# Patient Record
Sex: FEMALE | Race: WHITE | Employment: UNEMPLOYED | URBAN - METROPOLITAN AREA
[De-identification: names, ages, dates, MRNs, and addresses within clinical notes are randomized per-mention and may not be internally consistent; named-entity substitution may affect disease eponyms.]

---

## 2024-02-16 RX ORDER — CLOTRIMAZOLE 1 %
CREAM (GRAM) TOPICAL
Status: ON HOLD | COMMUNITY
Start: 2023-12-17 | End: 2024-02-20

## 2024-02-16 RX ORDER — PANTOPRAZOLE SODIUM 40 MG/1
TABLET, DELAYED RELEASE ORAL
Status: ON HOLD | COMMUNITY
Start: 2024-01-06 | End: 2024-02-20

## 2024-02-16 RX ORDER — HYDROCORTISONE 10 MG/1
10 TABLET ORAL DAILY
COMMUNITY

## 2024-02-16 RX ORDER — CEFUROXIME AXETIL 250 MG/1
TABLET ORAL
Status: ON HOLD | COMMUNITY
Start: 2023-12-17 | End: 2024-02-20

## 2024-02-19 ENCOUNTER — OFFICE VISIT (OUTPATIENT)
Age: 29
End: 2024-02-19
Payer: MEDICAID

## 2024-02-19 ENCOUNTER — HOSPITAL ENCOUNTER (INPATIENT)
Facility: HOSPITAL | Age: 29
LOS: 4 days | Discharge: HOME | DRG: 204 | End: 2024-02-23
Attending: EMERGENCY MEDICINE | Admitting: INTERNAL MEDICINE
Payer: MEDICAID

## 2024-02-19 ENCOUNTER — APPOINTMENT (OUTPATIENT)
Facility: HOSPITAL | Age: 29
DRG: 204 | End: 2024-02-19
Payer: MEDICAID

## 2024-02-19 VITALS
BODY MASS INDEX: 18.22 KG/M2 | HEIGHT: 61 IN | SYSTOLIC BLOOD PRESSURE: 92 MMHG | HEART RATE: 81 BPM | DIASTOLIC BLOOD PRESSURE: 60 MMHG | RESPIRATION RATE: 17 BRPM | OXYGEN SATURATION: 96 % | WEIGHT: 96.5 LBS

## 2024-02-19 DIAGNOSIS — R53.1 WEAKNESS: ICD-10-CM

## 2024-02-19 DIAGNOSIS — E55.9 VITAMIN D DEFICIENCY: Primary | ICD-10-CM

## 2024-02-19 DIAGNOSIS — R79.89 LOW SERUM CORTISOL LEVEL: ICD-10-CM

## 2024-02-19 DIAGNOSIS — R55 SYNCOPE AND COLLAPSE: Primary | ICD-10-CM

## 2024-02-19 DIAGNOSIS — E53.8 VITAMIN B12 DEFICIENCY: ICD-10-CM

## 2024-02-19 PROBLEM — E27.40 ADRENAL INSUFFICIENCY (HCC): Status: ACTIVE | Noted: 2024-02-19

## 2024-02-19 LAB
ALBUMIN SERPL-MCNC: 4.2 G/DL (ref 3.5–5.2)
ALBUMIN/GLOB SERPL: 1.4 (ref 1.1–2.2)
ALP SERPL-CCNC: 99 U/L (ref 35–104)
ALT SERPL-CCNC: 18 U/L (ref 10–35)
ANION GAP SERPL CALC-SCNC: 11 MMOL/L (ref 5–15)
AST SERPL-CCNC: 16 U/L (ref 10–35)
BASOPHILS # BLD: 0 K/UL (ref 0–1)
BASOPHILS NFR BLD: 1 % (ref 0–1)
BILIRUB SERPL-MCNC: 0.2 MG/DL (ref 0.2–1)
BUN SERPL-MCNC: 14 MG/DL (ref 6–20)
BUN/CREAT SERPL: 27 (ref 12–20)
CALCIUM SERPL-MCNC: 8.8 MG/DL (ref 8.6–10)
CHLORIDE SERPL-SCNC: 102 MMOL/L (ref 98–107)
CO2 SERPL-SCNC: 24 MMOL/L (ref 22–29)
COMMENT:: NORMAL
CREAT SERPL-MCNC: 0.51 MG/DL (ref 0.5–0.9)
DIFFERENTIAL METHOD BLD: ABNORMAL
EOSINOPHIL # BLD: 0.2 K/UL (ref 0–0.4)
EOSINOPHIL NFR BLD: 3 %
ERYTHROCYTE [DISTWIDTH] IN BLOOD BY AUTOMATED COUNT: 13.9 % (ref 11.5–14.5)
GLOBULIN SER CALC-MCNC: 3.1 G/DL (ref 2–4)
GLUCOSE SERPL-MCNC: 78 MG/DL (ref 65–100)
HCT VFR BLD AUTO: 36.6 % (ref 35–47)
HGB BLD-MCNC: 12.4 G/DL (ref 11.5–16)
IMM GRANULOCYTES # BLD AUTO: 0 K/UL (ref 0–0.04)
IMM GRANULOCYTES NFR BLD AUTO: 0 % (ref 0–0.5)
LYMPHOCYTES # BLD: 3 K/UL (ref 0.8–3.5)
LYMPHOCYTES NFR BLD: 48 % (ref 12–49)
MAGNESIUM SERPL-MCNC: 2 MG/DL (ref 1.6–2.6)
MCH RBC QN AUTO: 29.7 PG (ref 26–34)
MCHC RBC AUTO-ENTMCNC: 33.9 G/DL (ref 30–36.5)
MCV RBC AUTO: 87.8 FL (ref 80–99)
MONOCYTES # BLD: 0.6 K/UL (ref 0–1)
MONOCYTES NFR BLD: 9 % (ref 5–13)
NEUTS SEG # BLD: 2.4 K/UL (ref 1.8–8)
NEUTS SEG NFR BLD: 39 % (ref 32–75)
NRBC # BLD: 0 K/UL (ref 0–0.01)
NRBC BLD-RTO: 0 PER 100 WBC
PLATELET # BLD AUTO: 290 K/UL (ref 150–400)
PMV BLD AUTO: 9.8 FL (ref 8.9–12.9)
POTASSIUM SERPL-SCNC: 3.9 MMOL/L (ref 3.5–5.1)
PROT SERPL-MCNC: 7.3 G/DL (ref 6.4–8.3)
RBC # BLD AUTO: 4.17 M/UL (ref 3.8–5.2)
SODIUM SERPL-SCNC: 137 MMOL/L (ref 136–145)
SPECIMEN HOLD: NORMAL
T4 FREE SERPL-MCNC: 1.2 NG/DL (ref 0.8–1.5)
TROPONIN I BLD-MCNC: <0.04 NG/ML (ref 0–0.08)
TSH SERPL DL<=0.05 MIU/L-ACNC: 2.55 UIU/ML (ref 0.27–4.2)
WBC # BLD AUTO: 6.2 K/UL (ref 3.6–11)

## 2024-02-19 PROCEDURE — 2580000003 HC RX 258: Performed by: INTERNAL MEDICINE

## 2024-02-19 PROCEDURE — A4216 STERILE WATER/SALINE, 10 ML: HCPCS | Performed by: INTERNAL MEDICINE

## 2024-02-19 PROCEDURE — 84484 ASSAY OF TROPONIN QUANT: CPT

## 2024-02-19 PROCEDURE — 6360000002 HC RX W HCPCS: Performed by: EMERGENCY MEDICINE

## 2024-02-19 PROCEDURE — 99205 OFFICE O/P NEW HI 60 MIN: CPT | Performed by: INTERNAL MEDICINE

## 2024-02-19 PROCEDURE — 93005 ELECTROCARDIOGRAM TRACING: CPT | Performed by: INTERNAL MEDICINE

## 2024-02-19 PROCEDURE — G0378 HOSPITAL OBSERVATION PER HR: HCPCS

## 2024-02-19 PROCEDURE — 99285 EMERGENCY DEPT VISIT HI MDM: CPT

## 2024-02-19 PROCEDURE — 2580000003 HC RX 258: Performed by: EMERGENCY MEDICINE

## 2024-02-19 PROCEDURE — 84443 ASSAY THYROID STIM HORMONE: CPT

## 2024-02-19 PROCEDURE — 2500000003 HC RX 250 WO HCPCS: Performed by: INTERNAL MEDICINE

## 2024-02-19 PROCEDURE — 84439 ASSAY OF FREE THYROXINE: CPT

## 2024-02-19 PROCEDURE — 96361 HYDRATE IV INFUSION ADD-ON: CPT

## 2024-02-19 PROCEDURE — 83735 ASSAY OF MAGNESIUM: CPT

## 2024-02-19 PROCEDURE — 85025 COMPLETE CBC W/AUTO DIFF WBC: CPT

## 2024-02-19 PROCEDURE — 96375 TX/PRO/DX INJ NEW DRUG ADDON: CPT

## 2024-02-19 PROCEDURE — 82533 TOTAL CORTISOL: CPT

## 2024-02-19 PROCEDURE — 71045 X-RAY EXAM CHEST 1 VIEW: CPT

## 2024-02-19 PROCEDURE — 70450 CT HEAD/BRAIN W/O DYE: CPT

## 2024-02-19 PROCEDURE — 80053 COMPREHEN METABOLIC PANEL: CPT

## 2024-02-19 PROCEDURE — 1100000000 HC RM PRIVATE

## 2024-02-19 PROCEDURE — 96374 THER/PROPH/DIAG INJ IV PUSH: CPT

## 2024-02-19 PROCEDURE — 36415 COLL VENOUS BLD VENIPUNCTURE: CPT

## 2024-02-19 RX ORDER — ACETAMINOPHEN 650 MG/1
650 SUPPOSITORY RECTAL EVERY 6 HOURS PRN
Status: DISCONTINUED | OUTPATIENT
Start: 2024-02-19 | End: 2024-02-23 | Stop reason: HOSPADM

## 2024-02-19 RX ORDER — SODIUM CHLORIDE, SODIUM LACTATE, POTASSIUM CHLORIDE, AND CALCIUM CHLORIDE .6; .31; .03; .02 G/100ML; G/100ML; G/100ML; G/100ML
1000 INJECTION, SOLUTION INTRAVENOUS ONCE
Status: COMPLETED | OUTPATIENT
Start: 2024-02-19 | End: 2024-02-19

## 2024-02-19 RX ORDER — POLYETHYLENE GLYCOL 3350 17 G/17G
17 POWDER, FOR SOLUTION ORAL DAILY PRN
Status: DISCONTINUED | OUTPATIENT
Start: 2024-02-19 | End: 2024-02-23 | Stop reason: HOSPADM

## 2024-02-19 RX ORDER — SODIUM CHLORIDE 0.9 % (FLUSH) 0.9 %
5-40 SYRINGE (ML) INJECTION EVERY 12 HOURS SCHEDULED
Status: DISCONTINUED | OUTPATIENT
Start: 2024-02-19 | End: 2024-02-23 | Stop reason: HOSPADM

## 2024-02-19 RX ORDER — SODIUM CHLORIDE 9 MG/ML
INJECTION, SOLUTION INTRAVENOUS PRN
Status: DISCONTINUED | OUTPATIENT
Start: 2024-02-19 | End: 2024-02-23 | Stop reason: HOSPADM

## 2024-02-19 RX ORDER — ONDANSETRON 2 MG/ML
4 INJECTION INTRAMUSCULAR; INTRAVENOUS EVERY 6 HOURS PRN
Status: DISCONTINUED | OUTPATIENT
Start: 2024-02-19 | End: 2024-02-23 | Stop reason: HOSPADM

## 2024-02-19 RX ORDER — SODIUM CHLORIDE 9 MG/ML
INJECTION, SOLUTION INTRAVENOUS CONTINUOUS
Status: DISCONTINUED | OUTPATIENT
Start: 2024-02-19 | End: 2024-02-23 | Stop reason: HOSPADM

## 2024-02-19 RX ORDER — MIDAZOLAM HYDROCHLORIDE 5 MG/5ML
5 INJECTION, SOLUTION INTRAMUSCULAR; INTRAVENOUS ONCE
Status: DISCONTINUED | OUTPATIENT
Start: 2024-02-19 | End: 2024-02-19

## 2024-02-19 RX ORDER — SODIUM CHLORIDE 0.9 % (FLUSH) 0.9 %
5-40 SYRINGE (ML) INJECTION PRN
Status: DISCONTINUED | OUTPATIENT
Start: 2024-02-19 | End: 2024-02-23 | Stop reason: HOSPADM

## 2024-02-19 RX ORDER — LORAZEPAM 2 MG/ML
1 INJECTION INTRAMUSCULAR ONCE
Status: COMPLETED | OUTPATIENT
Start: 2024-02-19 | End: 2024-02-20

## 2024-02-19 RX ORDER — ACETAMINOPHEN 325 MG/1
650 TABLET ORAL EVERY 6 HOURS PRN
Status: DISCONTINUED | OUTPATIENT
Start: 2024-02-19 | End: 2024-02-23 | Stop reason: HOSPADM

## 2024-02-19 RX ADMIN — SODIUM CHLORIDE, PRESERVATIVE FREE 10 ML: 5 INJECTION INTRAVENOUS at 20:42

## 2024-02-19 RX ADMIN — SODIUM CHLORIDE: 9 INJECTION, SOLUTION INTRAVENOUS at 20:40

## 2024-02-19 RX ADMIN — HYDROCORTISONE SODIUM SUCCINATE 100 MG: 100 INJECTION, POWDER, FOR SOLUTION INTRAMUSCULAR; INTRAVENOUS at 17:20

## 2024-02-19 RX ADMIN — LORAZEPAM 1 MG: 2 INJECTION INTRAMUSCULAR; INTRAVENOUS at 18:34

## 2024-02-19 RX ADMIN — FAMOTIDINE 20 MG: 10 INJECTION, SOLUTION INTRAVENOUS at 20:41

## 2024-02-19 RX ADMIN — SODIUM CHLORIDE, POTASSIUM CHLORIDE, SODIUM LACTATE AND CALCIUM CHLORIDE 1000 ML: 600; 310; 30; 20 INJECTION, SOLUTION INTRAVENOUS at 17:20

## 2024-02-19 ASSESSMENT — PAIN SCALES - GENERAL: PAINLEVEL_OUTOF10: 0

## 2024-02-19 ASSESSMENT — PAIN - FUNCTIONAL ASSESSMENT: PAIN_FUNCTIONAL_ASSESSMENT: NONE - DENIES PAIN

## 2024-02-19 ASSESSMENT — LIFESTYLE VARIABLES
HOW OFTEN DO YOU HAVE A DRINK CONTAINING ALCOHOL: NEVER
HOW MANY STANDARD DRINKS CONTAINING ALCOHOL DO YOU HAVE ON A TYPICAL DAY: PATIENT DOES NOT DRINK

## 2024-02-19 NOTE — PATIENT INSTRUCTIONS
Important     I have ordered medication/test and if you do not hear from the hospital in 7 business days  please call the number below for infusion center    [facility] [phone]

## 2024-02-19 NOTE — ED PROVIDER NOTES
EMERGENCY DEPARTMENT PHYSICIAN NOTE     Patient: Yasemin Manzano     Time of Service: 2024  3:58 PM     Chief complaint:   Chief Complaint   Patient presents with    Loss of Consciousness        HISTORY:  Patient is a 29 y.o. female who presents to the emergency department with complaints of syncope.       Past Medical History:   Diagnosis Date    Adrenal insufficiency (HCC)     Anxiety     Depression         Past Surgical History:   Procedure Laterality Date    APPENDECTOMY       SECTION  2023    CHOLECYSTECTOMY          No family history on file.     Social History     Socioeconomic History    Marital status: Single   Tobacco Use    Smoking status: Never    Smokeless tobacco: Never   Substance and Sexual Activity    Alcohol use: Never    Drug use: Never    Sexual activity: Not Currently     Social Determinants of Health     Food Insecurity: No Food Insecurity (2024)    Hunger Vital Sign     Worried About Running Out of Food in the Last Year: Never true     Ran Out of Food in the Last Year: Never true   Transportation Needs: No Transportation Needs (2024)    PRAPARE - Transportation     Lack of Transportation (Medical): No     Lack of Transportation (Non-Medical): No   Housing Stability: Low Risk  (2024)    Housing Stability Vital Sign     Unable to Pay for Housing in the Last Year: No     Number of Places Lived in the Last Year: 2     Unstable Housing in the Last Year: No        Current Medications: Reviewed in chart.    Allergies:   Allergies   Allergen Reactions    Beef Allergy Hives          REVIEW OF SYSTEMS: See HPI for pertinent positives and negatives.      PHYSICAL EXAM:  /62   Pulse 78   Temp 98.4 °F (36.9 °C)   Resp 19   Ht 1.549 m (5' 1\")   Wt 43.5 kg (96 lb)   SpO2 97%   BMI 18.14 kg/m²    Physical Exam  Vitals and nursing note reviewed.   Constitutional:       General: She is not in acute distress.     Appearance: Normal appearance. She is normal weight.

## 2024-02-19 NOTE — ED TRIAGE NOTES
To ED per EMS from [facility].  Patient was at follow up appointment for continuing episodes of rash breaking out, followed by syncope.  Sometimes this occurs daily, or has gone as long as a couple of weeks without it occurring.  EMS states had syncope x 3 in there presence.  Patient groggy afterwards, but normalized within a few minutes.    Patient awake, but can't tell me the day of week, states year is 2023, but can name [place] President.

## 2024-02-20 ENCOUNTER — APPOINTMENT (OUTPATIENT)
Facility: HOSPITAL | Age: 29
DRG: 204 | End: 2024-02-20
Payer: MEDICAID

## 2024-02-20 ENCOUNTER — APPOINTMENT (OUTPATIENT)
Facility: HOSPITAL | Age: 29
DRG: 204 | End: 2024-02-20
Attending: INTERNAL MEDICINE
Payer: MEDICAID

## 2024-02-20 LAB
ALBUMIN SERPL-MCNC: 3.1 G/DL (ref 3.5–5)
ALBUMIN/GLOB SERPL: 0.9 (ref 1.1–2.2)
ALP SERPL-CCNC: 93 U/L (ref 45–117)
ALT SERPL-CCNC: 21 U/L (ref 12–78)
AMMONIA PLAS-SCNC: <10 UMOL/L
ANION GAP SERPL CALC-SCNC: 2 MMOL/L (ref 5–15)
AST SERPL-CCNC: 11 U/L (ref 15–37)
BASOPHILS # BLD: 0 K/UL (ref 0–0.1)
BASOPHILS NFR BLD: 1 % (ref 0–1)
BILIRUB SERPL-MCNC: 0.2 MG/DL (ref 0.2–1)
BUN SERPL-MCNC: 13 MG/DL (ref 6–20)
BUN/CREAT SERPL: 18 (ref 12–20)
CALCIUM SERPL-MCNC: 8.5 MG/DL (ref 8.5–10.1)
CHLORIDE SERPL-SCNC: 114 MMOL/L (ref 97–108)
CK SERPL-CCNC: 61 U/L (ref 26–192)
CO2 SERPL-SCNC: 24 MMOL/L (ref 21–32)
CORTIS SERPL-MCNC: 4.4 UG/DL
CREAT SERPL-MCNC: 0.73 MG/DL (ref 0.55–1.02)
CRP SERPL-MCNC: <0.29 MG/DL (ref 0–0.3)
DIFFERENTIAL METHOD BLD: NORMAL
EOSINOPHIL # BLD: 0 K/UL (ref 0–0.4)
EOSINOPHIL NFR BLD: 0 % (ref 0–7)
ERYTHROCYTE [DISTWIDTH] IN BLOOD BY AUTOMATED COUNT: 14.2 % (ref 11.5–14.5)
EST. AVERAGE GLUCOSE BLD GHB EST-MCNC: 105 MG/DL
GLOBULIN SER CALC-MCNC: 3.6 G/DL (ref 2–4)
GLUCOSE BLD STRIP.AUTO-MCNC: 131 MG/DL (ref 65–117)
GLUCOSE SERPL-MCNC: 169 MG/DL (ref 65–100)
HBA1C MFR BLD: 5.3 % (ref 4–5.6)
HCT VFR BLD AUTO: 35.3 % (ref 35–47)
HGB BLD-MCNC: 12 G/DL (ref 11.5–16)
IMM GRANULOCYTES # BLD AUTO: 0 K/UL (ref 0–0.04)
IMM GRANULOCYTES NFR BLD AUTO: 0 % (ref 0–0.5)
LYMPHOCYTES # BLD: 1.3 K/UL (ref 0.8–3.5)
LYMPHOCYTES NFR BLD: 20 % (ref 12–49)
MAGNESIUM SERPL-MCNC: 1.9 MG/DL (ref 1.6–2.4)
MCH RBC QN AUTO: 29.9 PG (ref 26–34)
MCHC RBC AUTO-ENTMCNC: 34 G/DL (ref 30–36.5)
MCV RBC AUTO: 87.8 FL (ref 80–99)
MONOCYTES # BLD: 0.4 K/UL (ref 0–1)
MONOCYTES NFR BLD: 5 % (ref 5–13)
NEUTS SEG # BLD: 4.8 K/UL (ref 1.8–8)
NEUTS SEG NFR BLD: 74 % (ref 32–75)
NRBC # BLD: 0 K/UL (ref 0–0.01)
NRBC BLD-RTO: 0 PER 100 WBC
PHOSPHATE SERPL-MCNC: 2.3 MG/DL (ref 2.6–4.7)
PLATELET # BLD AUTO: 270 K/UL (ref 150–400)
PMV BLD AUTO: 9.6 FL (ref 8.9–12.9)
POTASSIUM SERPL-SCNC: 4.3 MMOL/L (ref 3.5–5.1)
PROCALCITONIN SERPL-MCNC: <0.05 NG/ML
PROLACTIN SERPL-MCNC: 13.7 NG/ML
PROT SERPL-MCNC: 6.7 G/DL (ref 6.4–8.2)
RBC # BLD AUTO: 4.02 M/UL (ref 3.8–5.2)
SERVICE CMNT-IMP: ABNORMAL
SODIUM SERPL-SCNC: 140 MMOL/L (ref 136–145)
T3FREE SERPL-MCNC: 2.2 PG/ML (ref 2.2–4)
TROPONIN I SERPL HS-MCNC: <4 NG/L (ref 0–51)
WBC # BLD AUTO: 6.5 K/UL (ref 3.6–11)

## 2024-02-20 PROCEDURE — A4216 STERILE WATER/SALINE, 10 ML: HCPCS | Performed by: INTERNAL MEDICINE

## 2024-02-20 PROCEDURE — 70553 MRI BRAIN STEM W/O & W/DYE: CPT

## 2024-02-20 PROCEDURE — A9579 GAD-BASE MR CONTRAST NOS,1ML: HCPCS | Performed by: RADIOLOGY

## 2024-02-20 PROCEDURE — 2500000003 HC RX 250 WO HCPCS: Performed by: INTERNAL MEDICINE

## 2024-02-20 PROCEDURE — 1100000000 HC RM PRIVATE

## 2024-02-20 PROCEDURE — 96376 TX/PRO/DX INJ SAME DRUG ADON: CPT

## 2024-02-20 PROCEDURE — 84146 ASSAY OF PROLACTIN: CPT

## 2024-02-20 PROCEDURE — G0378 HOSPITAL OBSERVATION PER HR: HCPCS

## 2024-02-20 PROCEDURE — 86140 C-REACTIVE PROTEIN: CPT

## 2024-02-20 PROCEDURE — 84481 FREE ASSAY (FT-3): CPT

## 2024-02-20 PROCEDURE — 93005 ELECTROCARDIOGRAM TRACING: CPT

## 2024-02-20 PROCEDURE — 82962 GLUCOSE BLOOD TEST: CPT

## 2024-02-20 PROCEDURE — 6360000002 HC RX W HCPCS: Performed by: INTERNAL MEDICINE

## 2024-02-20 PROCEDURE — 6360000004 HC RX CONTRAST MEDICATION: Performed by: RADIOLOGY

## 2024-02-20 PROCEDURE — 84145 PROCALCITONIN (PCT): CPT

## 2024-02-20 PROCEDURE — 83735 ASSAY OF MAGNESIUM: CPT

## 2024-02-20 PROCEDURE — 2580000003 HC RX 258: Performed by: INTERNAL MEDICINE

## 2024-02-20 PROCEDURE — 84100 ASSAY OF PHOSPHORUS: CPT

## 2024-02-20 PROCEDURE — 84484 ASSAY OF TROPONIN QUANT: CPT

## 2024-02-20 PROCEDURE — 6360000002 HC RX W HCPCS: Performed by: NURSE PRACTITIONER

## 2024-02-20 PROCEDURE — 83036 HEMOGLOBIN GLYCOSYLATED A1C: CPT

## 2024-02-20 PROCEDURE — 93005 ELECTROCARDIOGRAM TRACING: CPT | Performed by: HOSPITALIST

## 2024-02-20 PROCEDURE — 96361 HYDRATE IV INFUSION ADD-ON: CPT

## 2024-02-20 PROCEDURE — 82550 ASSAY OF CK (CPK): CPT

## 2024-02-20 PROCEDURE — 94761 N-INVAS EAR/PLS OXIMETRY MLT: CPT

## 2024-02-20 PROCEDURE — 85025 COMPLETE CBC W/AUTO DIFF WBC: CPT

## 2024-02-20 PROCEDURE — 36415 COLL VENOUS BLD VENIPUNCTURE: CPT

## 2024-02-20 PROCEDURE — 82140 ASSAY OF AMMONIA: CPT

## 2024-02-20 PROCEDURE — 80053 COMPREHEN METABOLIC PANEL: CPT

## 2024-02-20 PROCEDURE — 6360000002 HC RX W HCPCS

## 2024-02-20 RX ORDER — LORAZEPAM 2 MG/ML
INJECTION INTRAMUSCULAR
Status: COMPLETED
Start: 2024-02-20 | End: 2024-02-20

## 2024-02-20 RX ORDER — LORAZEPAM 2 MG/ML
2 INJECTION INTRAMUSCULAR
Status: COMPLETED | OUTPATIENT
Start: 2024-02-20 | End: 2024-02-20

## 2024-02-20 RX ORDER — LORAZEPAM 2 MG/ML
1 INJECTION INTRAMUSCULAR
Status: DISCONTINUED | OUTPATIENT
Start: 2024-02-20 | End: 2024-02-20

## 2024-02-20 RX ORDER — PANTOPRAZOLE SODIUM 40 MG/1
40 TABLET, DELAYED RELEASE ORAL
Status: DISCONTINUED | OUTPATIENT
Start: 2024-02-21 | End: 2024-02-23 | Stop reason: HOSPADM

## 2024-02-20 RX ADMIN — SODIUM CHLORIDE: 9 INJECTION, SOLUTION INTRAVENOUS at 07:19

## 2024-02-20 RX ADMIN — WATER 100 MG: 1 INJECTION INTRAMUSCULAR; INTRAVENOUS; SUBCUTANEOUS at 00:16

## 2024-02-20 RX ADMIN — GADOTERIDOL 8 ML: 279.3 INJECTION, SOLUTION INTRAVENOUS at 10:58

## 2024-02-20 RX ADMIN — LORAZEPAM 1 MG: 2 INJECTION INTRAMUSCULAR; INTRAVENOUS at 09:00

## 2024-02-20 RX ADMIN — LORAZEPAM 2 MG: 2 INJECTION INTRAMUSCULAR; INTRAVENOUS at 20:19

## 2024-02-20 RX ADMIN — WATER 100 MG: 1 INJECTION INTRAMUSCULAR; INTRAVENOUS; SUBCUTANEOUS at 12:51

## 2024-02-20 RX ADMIN — SODIUM CHLORIDE, PRESERVATIVE FREE 10 ML: 5 INJECTION INTRAVENOUS at 12:54

## 2024-02-20 RX ADMIN — FAMOTIDINE 20 MG: 10 INJECTION, SOLUTION INTRAVENOUS at 12:51

## 2024-02-20 RX ADMIN — WATER 100 MG: 1 INJECTION INTRAMUSCULAR; INTRAVENOUS; SUBCUTANEOUS at 21:48

## 2024-02-20 RX ADMIN — SODIUM CHLORIDE, PRESERVATIVE FREE 10 ML: 5 INJECTION INTRAVENOUS at 21:48

## 2024-02-20 ASSESSMENT — PAIN SCALES - GENERAL: PAINLEVEL_OUTOF10: 0

## 2024-02-20 NOTE — PROGRESS NOTES
Physical Therapy Note:  Chart reviewed in preparation for eval. Attempted to see patient, who was CARMEN for MRI. Will defer and continue to follow.  Jimi Macias, PT

## 2024-02-20 NOTE — ED NOTES
Pt now alert and oriented x 3.  Pt oriented to self, place, and day however answers 2023 to year.  Pt rash no longer visible to neck and upper chest.  
TRANSFER - OUT REPORT:    Verbal report given to PANCHITO Haider on Yasemin Manzano  being transferred to [facility] for routine progression of patient care       Report consisted of patient's Situation, Background, Assessment and   Recommendations(SBAR).     Information from the following report(s) Nurse Handoff Report, ED Encounter Summary, ED SBAR, MAR, and Recent Results was reviewed with the receiving nurse.    [place] Fall Assessment:    Presents to emergency department  because of falls (Syncope, seizure, or loss of consciousness): Yes  Age > 70: No  Altered Mental Status, Intoxication with alcohol or substance confusion (Disorientation, impaired judgment, poor safety awaremess, or inability to follow instructions): Yes  Impaired Mobility: Ambulates or transfers with assistive devices or assistance; Unable to ambulate or transer.: No  Nursing Judgement: Yes          Lines:   Peripheral IV 02/19/24 Right Antecubital (Active)   Site Assessment Clean, dry & intact 02/19/24 1623   Phlebitis Assessment No symptoms 02/19/24 1623   Infiltration Assessment 0 02/19/24 1623        Opportunity for questions and clarification was provided.      Patient transported with:  [facility]        
TRANSFER - OUT REPORT:    Verbal report given to PANCHITO Quintana on Yasemin Manzano  being transferred to [facility] ED for routine progression of patient care       Report consisted of patient's Situation, Background, Assessment and   Recommendations(SBAR).     Information from the following report(s) Nurse Handoff Report, ED Encounter Summary, ED SBAR, MAR, and Recent Results was reviewed with the receiving nurse.    [place] Fall Assessment:    Presents to emergency department  because of falls (Syncope, seizure, or loss of consciousness): Yes  Age > 70: No  Altered Mental Status, Intoxication with alcohol or substance confusion (Disorientation, impaired judgment, poor safety awaremess, or inability to follow instructions): Yes  Impaired Mobility: Ambulates or transfers with assistive devices or assistance; Unable to ambulate or transer.: No  Nursing Judgement: Yes          Lines:   Peripheral IV 02/19/24 Right Antecubital (Active)   Site Assessment Clean, dry & intact 02/19/24 1623   Phlebitis Assessment No symptoms 02/19/24 1623   Infiltration Assessment 0 02/19/24 1623        Opportunity for questions and clarification was provided.      Patient transported with:  [facility]        
No

## 2024-02-20 NOTE — CONSULTS
Pt had seizure-like activity this morning, received ativan and is very lethargic. Discussed with hospitalist and will hold off on full consult for now. Will order event monitor however do not think the cause of her syncope is cardiac in nature. Hospitalist will contact us if abnormalities on echo.

## 2024-02-20 NOTE — CARE COORDINATION
02/20/24       Case management progress note    Received and reviewed handoff from previous CM.     Reason for Admission:     Syncope and collapse [R55]       RUR: 6%      Transition of care plan:  Chart reviewed, no CM needs noted. Please consult CM for any future discharge needs.    Deepthi Rankin RN, [facility]  Care management

## 2024-02-20 NOTE — PROGRESS NOTES
[facility]  [address] [place] [address]  [phone]      Hospitalist  Progress Note      NAME:       Yasemin Manzano   :        1995  MRM:        [id number]    Date of service: 2024      Subjective: Patient seen and examined by me. Patient admitted with recurrent syncope. Had 3 episodes  while at her endocrinologist's office. I witnessed a seizure like activity while examining her that seemed focal to her RUE     Objective:    Vital Signs:    /72   Pulse 71   Temp 98.2 °F (36.8 °C) (Oral)   Resp 18   Ht 1.549 m (5' 1\")   Wt 43.5 kg (96 lb)   SpO2 99%   BMI 18.14 kg/m²        Intake/Output Summary (Last 24 hours) at 2024 0745  Last data filed at 2024 0657  Gross per 24 hour   Intake 971.25 ml   Output --   Net 971.25 ml        Current inpatient medications reviewed:  Current Facility-Administered Medications   Medication Dose Route Frequency    0.9 % sodium chloride infusion   IntraVENous Continuous    sodium chloride flush 0.9 % injection 5-40 mL  5-40 mL IntraVENous 2 times per day    sodium chloride flush 0.9 % injection 5-40 mL  5-40 mL IntraVENous PRN    0.9 % sodium chloride infusion   IntraVENous PRN    ondansetron (ZOFRAN) injection 4 mg  4 mg IntraVENous Q6H PRN    polyethylene glycol (GLYCOLAX) packet 17 g  17 g Oral Daily PRN    famotidine (PEPCID) 20 mg in sodium chloride (PF) 0.9 % 10 mL injection  20 mg IntraVENous BID    acetaminophen (TYLENOL) tablet 650 mg  650 mg Oral Q6H PRN    Or    acetaminophen (TYLENOL) suppository 650 mg  650 mg Rectal Q6H PRN    hydrocortisone sodium succinate PF (SOLU-CORTEF) 100 mg in sterile water 2 mL injection  100 mg IntraVENous Q8H       Physical Examination:    General:   Weak and ill looking patient in no acute distress  Eyes:   pink conjunctivae, PERRLA with no discharge.  ENT:   no ottorrhea or rhinorrhea with dry mucous

## 2024-02-20 NOTE — PROGRESS NOTES
Patient had twitching activity that lasted about 5 mins, was able to converse with RN throughout episode. Perfect served MD. No medication given at this time, patient resting comfortably with friends at bedside. VSS. EEG tech at bedside.

## 2024-02-20 NOTE — CONSULTS
IntraVENous PRN    0.9 % sodium chloride infusion   IntraVENous PRN    ondansetron (ZOFRAN) injection 4 mg  4 mg IntraVENous Q6H PRN    polyethylene glycol (GLYCOLAX) packet 17 g  17 g Oral Daily PRN    famotidine (PEPCID) 20 mg in sodium chloride (PF) 0.9 % 10 mL injection  20 mg IntraVENous BID    acetaminophen (TYLENOL) tablet 650 mg  650 mg Oral Q6H PRN    Or    acetaminophen (TYLENOL) suppository 650 mg  650 mg Rectal Q6H PRN    hydrocortisone sodium succinate PF (SOLU-CORTEF) 100 mg in sterile water 2 mL injection  100 mg IntraVENous Q8H       Review of Systems: 12 point ROS negative apart reviewed in HPI    Objective:     Physical Exam:  Vitals:    02/20/24 1530   BP: 137/84   Pulse: 96   Resp: 18   Temp: 98.4 °F (36.9 °C)   SpO2:      SpO2 Readings from Last 6 Encounters:   02/20/24 97%   02/19/24 96%          Intake/Output Summary (Last 24 hours) at 2/20/2024 1707  Last data filed at 2/20/2024 0657  Gross per 24 hour   Intake 971.25 ml   Output --   Net 971.25 ml      General: no distress, comfortable  Skin:  No rash or palpable dermatologic mass lesions  HEENT: Pupils equal, sclera anicteric, oropharynx with no gross lesions  Cardiovascular: No abnormal audible heart sounds, well perfused, no edema  Respiratory:  No abnormal audible breath sounds, normal respiratory effort, no throacic deformity  GI:  Abdomen nondistended, nontender, no mass, no free fluid, no rebound or guarding.  Musculoskeletal:  No skeletal deformity nor acute arthritis noted.  Neurological:  Motor and sensory function intact in upper extremeties  Psychiatric:  Normal affect, memory intact, appears to have insight into current illness  Lymphatic:  No cervical, supraclavicular, or periumbilic lymphadenopathy     Clinical data reviewed  Laboratory data from this admission-unremarkable electrolytes with chloride 114 on IV fluids, normal BUN and creatinine, procalcitonin negative, troponin 1 negative, albumin 3.1, AST 11, total bilirubin  0.2, alk phos 93, ALT 21, random glucose levels 78, 169, hemoglobin A1c 5.3, prolactin level 13.7, TSH 2.55, free T41.2, normal CBC and differential    Diagnostic imaging reviewed  CT head noncontrast yesterday-unremarkable  Portable chest x-ray yesterday-unremarkable  MRI brain with and without contrast yesterday-unremarkable

## 2024-02-20 NOTE — PROGRESS NOTES
Occupational Therapy Note  2/20/2024    OT eval order received and acknowledged. Per discussion in rounds, no OT needs identified at this time thus will D/C OT orders. Please re-consult if pt status changes.    Thank you,  Kristin Mead OTR/L     No

## 2024-02-20 NOTE — H&P
[facility]  [address] [place] [address] [phone]        Hospitalist Admission History and Physical      NAME:  Yasemin Manzano   :   1995   MRN:  [id number]     PCP:  Jacquie Veliz PA-C     Date/Time of service:  2024  8:12 PM        Subjective:     CHIEF COMPLAINT: \"weakness and passing out\"     HISTORY OF PRESENT ILLNESS:     The patient is a 30 yo hx of adrenal insufficiency, presented w/ weakness, syncope.  The patient stated that since given birth last April, she has had profound weakness, associated with intermittent syncope.  The patient was diagnosed with adrenal insufficiency during a hospitalization and was placed on oral hydrocortisone.  She has been seeing an endocrinologist.  However, her syncopal episodes have been getting worse. She denied seizures, chest pain, SOB, nausea, vomiting, diarrhea.  Prior to syncopal episodes, patient would get lightheaded or headache.  In the ED, head CT was unremarkable.      Allergies   Allergen Reactions    Beef Allergy Hives       Prior to Admission medications    Medication Sig Start Date End Date Taking? Authorizing Provider   hydrocortisone (CORTEF) 10 MG tablet Take 1 tablet by mouth daily    Maria T Mabry MD   pantoprazole (PROTONIX) 40 MG tablet TAKE 1 TAB BY MOUTH DAILY FOR GI PROPHYLAXIS  Patient not taking: Reported on 2024   Maria T Mabry MD   clotrimazole (LOTRIMIN) 1 % cream  23   Maria T Mabry MD   cefUROXime (CEFTIN) 250 MG tablet  23   ProviderMaria T MD       Past Medical History:   Diagnosis Date    Adrenal insufficiency (HCC)     Anxiety     Depression         Past Surgical History:   Procedure Laterality Date    APPENDECTOMY       SECTION  2023    CHOLECYSTECTOMY         Social History     Tobacco Use    Smoking status: Never    Smokeless tobacco: Never   Substance Use Topics    Alcohol use: Never        No family history

## 2024-02-20 NOTE — PROGRESS NOTES
Attempted to see patient twice, the first time she had just been given Ativan and was unable to answer questions or participate in exam, the second time she was down for MRI.    Case discussed with Dr. Sanchez, this morning he witnessed an episode in which he was speaking to her, she had a behavioral arrest, began to twitch on her right side, the episode lasted approximately 3 minutes and resolved after 1 mg of Ativan.  He tells me that she has been having similar episodes which her endocrinologist was initially treating for adrenal insufficiency but then became concerned that these may actually be ictal in nature and sent her to the hospital for evaluation.  Given this will get an MRI of brain and continuous EEG overnight tonight to attempt to catch 1 of these episodes.  Will formally see patient tomorrow morning.    In the meantime please do not treat spells lasting less than 5 minutes, if spells must be treated please give 2 mg of IV push Ativan.

## 2024-02-20 NOTE — PLAN OF CARE
Problem: Pain  Goal: Verbalizes/displays adequate comfort level or baseline comfort level  2/20/2024 0328 by Amanda King RN  Outcome: Progressing  2/20/2024 0245 by Amanda King RN  Outcome: Progressing     Problem: Safety - Adult  Goal: Free from fall injury  2/20/2024 0328 by Amanda King RN  Outcome: Progressing  2/20/2024 0245 by Amanda King RN  Outcome: Progressing

## 2024-02-21 ENCOUNTER — APPOINTMENT (OUTPATIENT)
Facility: HOSPITAL | Age: 29
DRG: 204 | End: 2024-02-21
Attending: INTERNAL MEDICINE
Payer: MEDICAID

## 2024-02-21 PROBLEM — R56.9 OBSERVED SEIZURE-LIKE ACTIVITY (HCC): Status: ACTIVE | Noted: 2024-02-21

## 2024-02-21 LAB
ECHO AO ASC DIAM: 2.3 CM
ECHO AO ASCENDING AORTA INDEX: 1.67 CM/M2
ECHO AV AREA PEAK VELOCITY: 2 CM2
ECHO AV AREA VTI: 2.2 CM2
ECHO AV AREA/BSA PEAK VELOCITY: 1.4 CM2/M2
ECHO AV AREA/BSA VTI: 1.6 CM2/M2
ECHO AV MEAN GRADIENT: 5 MMHG
ECHO AV MEAN VELOCITY: 1 M/S
ECHO AV PEAK GRADIENT: 10 MMHG
ECHO AV PEAK VELOCITY: 1.6 M/S
ECHO AV VELOCITY RATIO: 0.69
ECHO AV VTI: 28.4 CM
ECHO BSA: 1.37 M2
ECHO LA DIAMETER INDEX: 2.1 CM/M2
ECHO LA DIAMETER: 2.9 CM
ECHO LA VOL A-L A2C: 40 ML (ref 22–52)
ECHO LA VOL A-L A4C: 23 ML (ref 22–52)
ECHO LA VOL BP: 32 ML (ref 22–52)
ECHO LA VOL MOD A2C: 38 ML (ref 22–52)
ECHO LA VOL MOD A4C: 22 ML (ref 22–52)
ECHO LA VOL/BSA BIPLANE: 23 ML/M2 (ref 16–34)
ECHO LA VOLUME AREA LENGTH: 33 ML
ECHO LA VOLUME INDEX A-L A2C: 29 ML/M2 (ref 16–34)
ECHO LA VOLUME INDEX A-L A4C: 17 ML/M2 (ref 16–34)
ECHO LA VOLUME INDEX AREA LENGTH: 24 ML/M2 (ref 16–34)
ECHO LA VOLUME INDEX MOD A2C: 28 ML/M2 (ref 16–34)
ECHO LA VOLUME INDEX MOD A4C: 16 ML/M2 (ref 16–34)
ECHO LV E' LATERAL VELOCITY: 19 CM/S
ECHO LV E' SEPTAL VELOCITY: 14 CM/S
ECHO LV EDV A2C: 75 ML
ECHO LV EDV A4C: 73 ML
ECHO LV EDV BP: 75 ML (ref 56–104)
ECHO LV EDV INDEX A4C: 53 ML/M2
ECHO LV EDV INDEX BP: 54 ML/M2
ECHO LV EDV NDEX A2C: 54 ML/M2
ECHO LV EJECTION FRACTION A2C: 74 %
ECHO LV EJECTION FRACTION A4C: 69 %
ECHO LV EJECTION FRACTION BIPLANE: 73 % (ref 55–100)
ECHO LV ESV A2C: 20 ML
ECHO LV ESV A4C: 23 ML
ECHO LV ESV BP: 21 ML (ref 19–49)
ECHO LV ESV INDEX A2C: 14 ML/M2
ECHO LV ESV INDEX A4C: 17 ML/M2
ECHO LV ESV INDEX BP: 15 ML/M2
ECHO LV FRACTIONAL SHORTENING: 46 % (ref 28–44)
ECHO LV INTERNAL DIMENSION DIASTOLE INDEX: 2.83 CM/M2
ECHO LV INTERNAL DIMENSION DIASTOLIC: 3.9 CM (ref 3.9–5.3)
ECHO LV INTERNAL DIMENSION SYSTOLIC INDEX: 1.52 CM/M2
ECHO LV INTERNAL DIMENSION SYSTOLIC: 2.1 CM
ECHO LV IVSD: 0.8 CM (ref 0.6–0.9)
ECHO LV MASS 2D: 89.7 G (ref 67–162)
ECHO LV MASS INDEX 2D: 65 G/M2 (ref 43–95)
ECHO LV POSTERIOR WALL DIASTOLIC: 0.8 CM (ref 0.6–0.9)
ECHO LV RELATIVE WALL THICKNESS RATIO: 0.41
ECHO LVOT AREA: 2.8 CM2
ECHO LVOT AV VTI INDEX: 0.79
ECHO LVOT DIAM: 1.9 CM
ECHO LVOT MEAN GRADIENT: 2 MMHG
ECHO LVOT PEAK GRADIENT: 5 MMHG
ECHO LVOT PEAK VELOCITY: 1.1 M/S
ECHO LVOT STROKE VOLUME INDEX: 46.2 ML/M2
ECHO LVOT SV: 63.8 ML
ECHO LVOT VTI: 22.5 CM
ECHO MV A VELOCITY: 0.61 M/S
ECHO MV AREA VTI: 2.2 CM2
ECHO MV E DECELERATION TIME (DT): 267.9 MS
ECHO MV E VELOCITY: 0.99 M/S
ECHO MV E/A RATIO: 1.62
ECHO MV E/E' LATERAL: 5.21
ECHO MV E/E' RATIO (AVERAGED): 6.14
ECHO MV LVOT VTI INDEX: 1.29
ECHO MV MAX VELOCITY: 1.1 M/S
ECHO MV MEAN GRADIENT: 2 MMHG
ECHO MV MEAN VELOCITY: 0.7 M/S
ECHO MV PEAK GRADIENT: 5 MMHG
ECHO MV VTI: 29.1 CM
ECHO PV MAX VELOCITY: 1.4 M/S
ECHO PV PEAK GRADIENT: 8 MMHG
ECHO RV INTERNAL DIMENSION: 3.4 CM
ECHO RV TAPSE: 2.3 CM (ref 1.7–?)
ECHO TV REGURGITANT MAX VELOCITY: 2.35 M/S
ECHO TV REGURGITANT PEAK GRADIENT: 22 MMHG
EKG ATRIAL RATE: 137 BPM
EKG ATRIAL RATE: 72 BPM
EKG ATRIAL RATE: 76 BPM
EKG DIAGNOSIS: NORMAL
EKG P AXIS: 39 DEGREES
EKG P AXIS: 46 DEGREES
EKG P AXIS: 72 DEGREES
EKG P-R INTERVAL: 130 MS
EKG P-R INTERVAL: 142 MS
EKG P-R INTERVAL: 152 MS
EKG Q-T INTERVAL: 280 MS
EKG Q-T INTERVAL: 396 MS
EKG Q-T INTERVAL: 412 MS
EKG QRS DURATION: 58 MS
EKG QRS DURATION: 66 MS
EKG QRS DURATION: 72 MS
EKG QTC CALCULATION (BAZETT): 422 MS
EKG QTC CALCULATION (BAZETT): 445 MS
EKG QTC CALCULATION (BAZETT): 451 MS
EKG R AXIS: 58 DEGREES
EKG R AXIS: 64 DEGREES
EKG R AXIS: 64 DEGREES
EKG T AXIS: -89 DEGREES
EKG T AXIS: 38 DEGREES
EKG T AXIS: 53 DEGREES
EKG VENTRICULAR RATE: 137 BPM
EKG VENTRICULAR RATE: 72 BPM
EKG VENTRICULAR RATE: 76 BPM
VIT B12 SERPL-MCNC: 938 PG/ML (ref 193–986)

## 2024-02-21 PROCEDURE — 6370000000 HC RX 637 (ALT 250 FOR IP): Performed by: INTERNAL MEDICINE

## 2024-02-21 PROCEDURE — 1100000000 HC RM PRIVATE

## 2024-02-21 PROCEDURE — 95714 VEEG EA 12-26 HR UNMNTR: CPT

## 2024-02-21 PROCEDURE — 96376 TX/PRO/DX INJ SAME DRUG ADON: CPT

## 2024-02-21 PROCEDURE — 82607 VITAMIN B-12: CPT

## 2024-02-21 PROCEDURE — 2580000003 HC RX 258: Performed by: INTERNAL MEDICINE

## 2024-02-21 PROCEDURE — 96361 HYDRATE IV INFUSION ADD-ON: CPT

## 2024-02-21 PROCEDURE — 93306 TTE W/DOPPLER COMPLETE: CPT | Performed by: INTERNAL MEDICINE

## 2024-02-21 PROCEDURE — 93306 TTE W/DOPPLER COMPLETE: CPT

## 2024-02-21 PROCEDURE — 82746 ASSAY OF FOLIC ACID SERUM: CPT

## 2024-02-21 PROCEDURE — G0378 HOSPITAL OBSERVATION PER HR: HCPCS

## 2024-02-21 PROCEDURE — 93010 ELECTROCARDIOGRAM REPORT: CPT | Performed by: SPECIALIST

## 2024-02-21 PROCEDURE — 2700000000 HC OXYGEN THERAPY PER DAY

## 2024-02-21 PROCEDURE — 6360000002 HC RX W HCPCS: Performed by: INTERNAL MEDICINE

## 2024-02-21 PROCEDURE — 86364 TISS TRNSGLTMNASE EA IG CLAS: CPT

## 2024-02-21 PROCEDURE — 86258 DGP ANTIBODY EACH IG CLASS: CPT

## 2024-02-21 PROCEDURE — 94761 N-INVAS EAR/PLS OXIMETRY MLT: CPT

## 2024-02-21 PROCEDURE — 82784 ASSAY IGA/IGD/IGG/IGM EACH: CPT

## 2024-02-21 PROCEDURE — 95719 EEG PHYS/QHP EA INCR W/O VID: CPT | Performed by: PSYCHIATRY & NEUROLOGY

## 2024-02-21 PROCEDURE — 36415 COLL VENOUS BLD VENIPUNCTURE: CPT

## 2024-02-21 RX ADMIN — WATER 100 MG: 1 INJECTION INTRAMUSCULAR; INTRAVENOUS; SUBCUTANEOUS at 05:24

## 2024-02-21 RX ADMIN — WATER 100 MG: 1 INJECTION INTRAMUSCULAR; INTRAVENOUS; SUBCUTANEOUS at 21:38

## 2024-02-21 RX ADMIN — WATER 100 MG: 1 INJECTION INTRAMUSCULAR; INTRAVENOUS; SUBCUTANEOUS at 13:04

## 2024-02-21 RX ADMIN — PANTOPRAZOLE SODIUM 40 MG: 40 TABLET, DELAYED RELEASE ORAL at 05:24

## 2024-02-21 RX ADMIN — SODIUM CHLORIDE: 9 INJECTION, SOLUTION INTRAVENOUS at 10:05

## 2024-02-21 ASSESSMENT — PAIN SCALES - GENERAL: PAINLEVEL_OUTOF10: 0

## 2024-02-21 NOTE — PROGRESS NOTES
2000: patient complain of chest pain. Rapid response was called with chest pain, EKG was done, 2 RRT nurses came to bedside with doctor. Patient start to have an seizures at 20:08 episode during rapid. Ativan 2 mg IV was pushed @ 20:20 by charge nurse GM Simpson. Labs were drawn and pt was calm after med's were push.
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
## 2024-02-21 NOTE — FLOWSHEET NOTE
Called to bedside due to chest pain. Upon arrival, patient w/ seizure-like activity - shaking of upper and lower extremities. Patient is alert and orientated and able to engage in conversation. Patient experienced no loss of bladder or bowel control. EEG in place. Ativan for seizure lasting > 5 mins.    Seizure-like activity has now subsided s/p ativan admin. Patient offers chest pain that developed while sitting in bed. Pain was substernal and radiated to her upper back. Pain was constant, described as sharp, unknown worsening or relieving factors, 6/10. Pain preceded seizure-like activity. Pain has since resolved. Patient offered accompanying dizziness and feeling like she \"can't catch her breath.\" Both have also resolved.     On exam, patient is alert and orientated w/ good insight, follows commands, anxious, able to maintain airway, NML WOB, lungs CTA, RRR, S1/S2, no edema, continent, no jerking or abnormal movements. EKG is non-ischemic. V - 120/86, 99% 2 L (comfort), 14, 71    Will check troponin. Continue seizure precautions. Ativan as needed. Continue EEG. Neurology following.    ADDENDUM: Trop negative - 4 No additional interventions at this time. Nursing to continue to monitor.

## 2024-02-21 NOTE — PROGRESS NOTES
Rapid Called at 2008    Responded to RRT at 2010 for Chest Pain    Provider at bedside: YES  Interventions ordered: Labs and EKG  Sepsis Suspected: No  Transfer to Higher Level of Care: no  Blood Glucose: 131     Vitals:    02/20/24 2026   BP: 118/82   Pulse: 62   Resp: 16   Temp: 98.6 °F (37 °C)   SpO2: 100%      Pt reports anterior chest pain with pain to mid back. Pt currently under EEG study for seizures. Upon arrival pt with tremulous arms and legs. Pt answering complete sentences, cont of bowel and bladder, making eye contact with staff. EKG, labs and BG done. Ativan given, pt tremor activity resolved.   Rapid Ended at 2030  RRT RN assisted with transport to accepting unit LANA Cox RN

## 2024-02-21 NOTE — PROGRESS NOTES
[facility]  [address] [place]  [address] [phone]         Hospitalist Progress Note        NAME:  Yasemin Manzano   :  1995   MRN:  [id number]    Date/Time:  2024     Patient PCP:  Jacquie Veliz PA-C    Code Status:  Full Code     Isolation Precautions: No active isolations    Barrier(s) to discharge: Currently not medically stable for discharge  Estimated date of discharge: 1-2 days  Discharge disposition:  Home with family    Assessment/Plan:      Syncope and collapse POA: recurrent and concerning for seizure activity vs [place]'s disease. Started after giving birth 2023 when she was diagnosed with adrenal insufficiency. EKG is normal. Troponin and TSH normal. BP low normal. Echo pending. EEG currently on-going for associated confusional state with witnessed seizure like activity. Continue IV fluids, IV Lorazepam PRN. Consult neurology and cardiology. Seizure precautions - But I doubt she is ctually having seizures.     Adrenal insufficiency POA: she is followed by Endocrinology and most recently Dr Jazmine Win on . Continue IV Solucortef.  MRI of the brain with no acute changes. Will see what Neuro says..     Esophageal dysphagia to solid foods/ ?hx esophageal dilation POA: GI note reviewed recommending soft mechanical diet for now.  Consider outpatient endoscopy with esophageal biopsy and dilatation.  Started omeprazole for either GERD, nonerosive reflux or symptomatic esophagitis as potential causes esophageal solid food dysphagia.    Chronic proximal pharyngeal diarrhea: GI following.  Ordered fecal leukocyte and if positive outpatient colonoscopy once acute issues are more stable.  Also check celiac serology and consider lactose and gluten-free diet.  Follow iron studies and B12/folate studies.     Underweight (18.4 or below): Body mass index is 18.14 kg/m².:  Weight gain advised      F:  NS 75 ml/hr  E:  Monitor  N:  ADULT DIET;

## 2024-02-21 NOTE — PROGRESS NOTES
Reviewed chart and spoke with nurse.  Patient attached to EEG, recording until 5 PM.  Will attempt Physical Therapy Evaluation tomorrow.

## 2024-02-21 NOTE — CONSULTS
Event monitor already ordered, see brief note from 2/20/24. TTE without abnormalities. Will arrange OP follow up. Do not think cause of syncope is cardiac. Discussed w/ hospitalist yesterday, Dr. Sanchez.     02/19/24    ECHO (TTE) COMPLETE (PRN CONTRAST/BUBBLE/STRAIN/3D) 02/21/2024  2:14 PM (Final)    Interpretation Summary    Left Ventricle: Normal left ventricular systolic function with a visually estimated EF of 55 - 60%. Left ventricle size is normal. Normal wall thickness. Normal wall motion. Normal diastolic function.    Interatrial Septum: No interatrial shunt visualized with color Doppler. Agitated saline study was negative with and without provocation.    Image quality is good. Procedure performed with the patient in a supine position.    Signed by: Juancarlos Alcantara MD on 2/21/2024  2:14 PM

## 2024-02-21 NOTE — PROGRESS NOTES
Comprehensive Nutrition Assessment    Type and Reason for Visit:  Initial    Nutrition Recommendations/Plan:   Consult SLP  Consider modification to diet: Easy to Chew, Lactose Controlled, Gluten free  - or texture per SLP  Provide Jonelle Ramirez 1.4 BID (910 kcal, 102g carbs, 40 g protein) to increase kcal/protein intake      Malnutrition Assessment:  Malnutrition Status:  Moderate malnutrition (02/21/24 1213)    Context:  Acute Illness     Findings of the 6 clinical characteristics of malnutrition:  Energy Intake:  75% or less of estimated energy requirements for 7 or more days  Weight Loss:  Unable to assess     Body Fat Loss:  Mild body fat loss Fat Overlying Ribs   Muscle Mass Loss:  Mild muscle mass loss Thigh (quadraceps), Clavicles (pectoralis & deltoids), Temples (temporalis)  Fluid Accumulation:  No significant fluid accumulation     Strength:  Not Performed    Nutrition Assessment:     Patient is a 29 year old female admitted with Syncope and collapse [R55]. She  has a past medical history of Adrenal insufficiency (HCC), Anxiety, and Depression.  RD screen for low BMI. Patient admission weight stated; RD ordered measured standing scale to be taken. Patient reports # but recently down to ~90#; lack of weight history in chart review. Reports r/t copious diarrhea, has been ongoing for several months. Celiac and UC workup in progress. Patient states she does not typically eat gluten or lactose at home, possible results will come back inaccurate. Placed on soft & bite sized diet order per GI for reports of dysphagia - no SLP consult noted. Recommend this, patient reports hx of esophageal dilation. Patient's mother bedside reports patient also breastfeeding at this time. Patient states she eats very healthy, \"no grease\", typical meals consist of only chicken or fish + vegetables. Beef allergy noted. Discussed increase kcal/nutrient needs in breastfeeding, patient voiced understanding. Voiced acceptance

## 2024-02-21 NOTE — PROCEDURES
Long Term (12 to 26 hour) EEG Monitoring Report (NO VIDEO)    [place], [place]        [phone] (Main)  [phone] (Medical Records)     Interpreting Physician:   Brianna Moreno MD  EEG Technologist:    Lencho Omalley    Start/ End:    2-/ 1610  To 2-/ 0905  Total Duration:    17 hr, 5 mins  Date of Interpretation:  2/21/24    Indication:      29 y.o. female who has a past medical history of Adrenal insufficiency (HCC), Anxiety, and Depression.  Admitted for Syncope and collapse [R55]. Per Admission H&P, pt developed profound weakness after giving birth in April 2023, and was then dx with adrenal insufficiency, put on hydrocortisone, followed by endocrinologist. Syncopal episodes have become more frequent/ more intense.  Gets light-headed or headache before the episodes.  On 2-20-24 while admitted, pt had witnessed episode where Hospitalist Attending was speaking with her and she was described to do the following: she had a behavioral arrest, began to twitch on her right side, the episode lasted approximately 3 minutes and resolved after 1 mg of Ativan.  Hospitalist d/w Neurology NP that endocrinologist became concerned that the episodes pt was having may be ictal in nature and sent her to hospital for evaluation.     Impression:  Essentially normal awake, drowsy, and sleep EEG recording.  There was mild excessive beta activity throughout the waking portions of the recording, suggestive of medication effect. The patient had one typical spell/ episode during this recording (see below).  There were no associated epileptiform discharges or electrographic seizures during this episode.  The remainder of the EEG recording also did not show any epileptiform discharges.  Clinical and Neuro-Imaging correlation is necessary.    =================================    Technical: 16-channel, multiple montages, digital EEG, 10-20 international placement system format.   Video was not

## 2024-02-22 PROCEDURE — 97162 PT EVAL MOD COMPLEX 30 MIN: CPT

## 2024-02-22 PROCEDURE — G0378 HOSPITAL OBSERVATION PER HR: HCPCS

## 2024-02-22 PROCEDURE — 97535 SELF CARE MNGMENT TRAINING: CPT

## 2024-02-22 PROCEDURE — 2580000003 HC RX 258: Performed by: INTERNAL MEDICINE

## 2024-02-22 PROCEDURE — 96361 HYDRATE IV INFUSION ADD-ON: CPT

## 2024-02-22 PROCEDURE — 97116 GAIT TRAINING THERAPY: CPT

## 2024-02-22 PROCEDURE — 1100000000 HC RM PRIVATE

## 2024-02-22 PROCEDURE — 97530 THERAPEUTIC ACTIVITIES: CPT

## 2024-02-22 PROCEDURE — 94761 N-INVAS EAR/PLS OXIMETRY MLT: CPT

## 2024-02-22 PROCEDURE — 97165 OT EVAL LOW COMPLEX 30 MIN: CPT

## 2024-02-22 PROCEDURE — 89055 LEUKOCYTE ASSESSMENT FECAL: CPT

## 2024-02-22 PROCEDURE — 6370000000 HC RX 637 (ALT 250 FOR IP): Performed by: INTERNAL MEDICINE

## 2024-02-22 PROCEDURE — 6360000002 HC RX W HCPCS: Performed by: INTERNAL MEDICINE

## 2024-02-22 PROCEDURE — 2580000003 HC RX 258: Performed by: HOSPITALIST

## 2024-02-22 PROCEDURE — 6360000002 HC RX W HCPCS: Performed by: HOSPITALIST

## 2024-02-22 PROCEDURE — 96376 TX/PRO/DX INJ SAME DRUG ADON: CPT

## 2024-02-22 RX ORDER — HYDROCORTISONE 10 MG/1
TABLET ORAL
Qty: 28 TABLET | Refills: 0 | Status: SHIPPED | OUTPATIENT
Start: 2024-02-22 | End: 2024-03-01

## 2024-02-22 RX ORDER — PANTOPRAZOLE SODIUM 40 MG/1
40 TABLET, DELAYED RELEASE ORAL
Qty: 30 TABLET | Refills: 0 | Status: SHIPPED | OUTPATIENT
Start: 2024-02-23 | End: 2024-07-02

## 2024-02-22 RX ORDER — ESCITALOPRAM OXALATE 10 MG/1
10 TABLET ORAL DAILY
Qty: 30 TABLET | Refills: 0 | Status: SHIPPED | OUTPATIENT
Start: 2024-02-22 | End: 2024-07-02

## 2024-02-22 RX ADMIN — SODIUM CHLORIDE: 9 INJECTION, SOLUTION INTRAVENOUS at 01:15

## 2024-02-22 RX ADMIN — WATER 100 MG: 1 INJECTION INTRAMUSCULAR; INTRAVENOUS; SUBCUTANEOUS at 21:24

## 2024-02-22 RX ADMIN — SODIUM CHLORIDE: 9 INJECTION, SOLUTION INTRAVENOUS at 15:52

## 2024-02-22 RX ADMIN — PANTOPRAZOLE SODIUM 40 MG: 40 TABLET, DELAYED RELEASE ORAL at 06:58

## 2024-02-22 RX ADMIN — WATER 100 MG: 1 INJECTION INTRAMUSCULAR; INTRAVENOUS; SUBCUTANEOUS at 06:58

## 2024-02-22 NOTE — CARE COORDINATION
Care Management Progress Note      ICD-10-CM    1. Syncope and collapse  R55 Echo (TTE) complete (PRN contrast/bubble/strain/3D)     Echo (TTE) complete (PRN contrast/bubble/strain/3D)     Cardiac event monitor     Cardiac event monitor          RUR:  6%  Risk Level: [x]Low []Moderate []High    Transition of care plan:  Per IDR, ADOD today or tomorrow Friday 2/23, pt's steroids to be weaned. Cardiology, neurology, and PT are following.     Dispo: home. No CM needs anticipated, please consult CM if needs arise.    Outpatient follow-up.    Pt's family to transport.        ___________________________________________   Sloane Gambino RN Case Manager  2/22/2024   11:58 AM

## 2024-02-22 NOTE — PLAN OF CARE
Problem: Physical Therapy - Adult  Goal: By Discharge: Performs mobility at highest level of function for planned discharge setting.  See evaluation for individualized goals.  Description: FUNCTIONAL STATUS PRIOR TO ADMISSION: Patient was independent and active without use of DME.    HOME SUPPORT PRIOR TO ADMISSION: The patient lived with friends but did not require assistance. Mother assists as well.  Pt has baby almost 1year old.    Physical Therapy Goals  Initiated 2/22/2024  1.  Patient will move from supine to sit and sit to supine in bed with independence within 7 day(s).    2.  Patient will perform sit to stand with independence within 7 day(s).  3.  Patient will transfer from bed to chair and chair to bed with independence using the least restrictive device within 7 day(s).  4.  Patient will ambulate with contact guard assist for 150 feet with the least restrictive device within 7 day(s).   5.  Patient will ascend/descend 5 stairs with  handrail(s) with contact guard assist within 7 day(s).  Outcome: Progressing   PHYSICAL THERAPY EVALUATION    Patient: Yasemin Manzano (29 y.o. female)  Date: 2/22/2024  Primary Diagnosis: Syncope and collapse [R55]       Precautions: Restrictions/Precautions: Seizure, Fall Risk                      ASSESSMENT :   DEFICITS/IMPAIRMENTS:   The patient is limited by decreased functional mobility, independence in ADLs, high-level IADLs, strength, sensation, body mechanics, activity tolerance, endurance, safety awareness, coordination, balance, proprioception.  Pt admitted due to syncope with collapse.  On seizure precautions.  Hx of since April of 2023 after birth of baby.    Based on the impairments listed above pt is demonstrating significant LLE weakness compromising gait, balance, transfers and safety.  She is needing Mod A with mobility and needing RW.  Pt scoring 22/56 on Garay Balance Test making her a high fall risk.  Educated with SLR, ankle pumping and quad sets.

## 2024-02-22 NOTE — PLAN OF CARE
Problem: Occupational Therapy - Adult  Goal: By Discharge: Performs self-care activities at highest level of function for planned discharge setting.  See evaluation for individualized goals.  Description: FUNCTIONAL STATUS PRIOR TO ADMISSION:  Patient was independent with ADLs and functional mobility/ ambulatory without AD.  She reports a few recent falls due to her legs giving out.  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    HOME SUPPORT: Patient resided with friends and her almost 1 year old baby    Occupational Therapy Goals:  Initiated 2/22/2024  1.  Patient will perform grooming standing at sink with Modified [place] within 7 day(s).  2.  Patient will perform bathing with Modified [place] within 7 day(s).  3.  Patient will perform lower body dressing with Modified [place] within 7 day(s).  4.  Patient will perform toilet transfers with Modified [place]  within 7 day(s).  5.  Patient will perform all aspects of toileting with Modified [place] within 7 day(s).  6.  Patient will utilize fall prevention techniques during functional activities with verbal cues within 7 day(s).    Outcome: Progressing     OCCUPATIONAL THERAPY EVALUATION    Patient: Yasemin Manzano (29 y.o. female)  Date: 2/22/2024  Primary Diagnosis: Syncope and collapse [R55]         Precautions: Seizure, Fall Risk                  ASSESSMENT :  At baseline PTA, patient was independent with ADLs and functional mobility/ ambulatory without AD.  She reports a few recent falls due to her legs giving out.  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  At home she lived with friends and her nearly 1 year old baby, and her mom helped her as needed.    Patient now admitted for syncope/ psychogenic spells per neurology.  She presents for OT evaluation with intact visual functions, perception, sensation, and BUE  Bathing: Contact guard assistance  UE Bathing Skilled Clinical Factors: inferred         LE Bathing: Contact guard assistance  LE Bathing Skilled Clinical Factors: inferred    UE Dressing: Contact guard assistance  UE Dressing Skilled Clinical Factors: inferred    LE Dressing: Contact guard assistance  LE Dressing Skilled Clinical Factors: inferred, adjusted sock standing    Toileting: Contact guard assistance  Toileting Skilled Clinical Factors: managed clothing and hygiene without assist.  infer up to [facility] \"6 Clicks\"                                                       Daily Activity Inpatient Short Form  AM-PAC Daily Activity - Inpatient   How much help is needed for putting on and taking off regular lower body clothing?: A Little  How much help is needed for bathing (which includes washing, rinsing, drying)?: A Little  How much help is needed for toileting (which includes using toilet, bedpan, or urinal)?: A Little  How much help is needed for putting on and taking off regular upper body clothing?: None  How much help is needed for taking care of personal grooming?: None  How much help for eating meals?: None  [facility] Inpatient Daily Activity Raw Score: 21  AM-PAC Inpatient ADL T-Scale Score : 44.27  ADL Inpatient CMS 0-100% Score: 32.79  ADL Inpatient CMS G-Code Modifier : CJ     Interpretation of Tool:  Represents clinically-significant functional categories (i.e. Activities of daily living).  Cutoff score 39.4 (19) correlates to a good likelihood of discharging home versus a facility  Shaniqua Tay, Mari Winkler, Joseph Schneider, Shefali Benson, Tay Alvarado, Sushil Tay, AM-PAC “6-Clicks” Functional Assessment Scores Predict Acute Care Hospital Discharge Destination, Physical Therapy, Volume 94, Issue 9, 1 September 2014, Pages 2613-1834, [email or web address]       Pain Rating:  Patient did not report pain    Activity Tolerance:

## 2024-02-22 NOTE — PROGRESS NOTES
Jossie Cespedes PA-C                       [phone] cell              Monday-Friday 8:00 am-4:30 pm       Gastroenterology Progress Note    February 22, 2024  Admit Date: 2/19/2024         Narrative Assessment and Plan   29YOF with history of adrenal insufficiency on hydrocortisone, history of ruptured appendix several years ago, s/p cholecystectomy, and history of esophageal stricture dilated on EGD several years ago, being evaluated for dysphagia and postprandial diarrhea. She initially ha resolution of dysphagia after dilation, but her symptoms have slowly returned and she has intermittent dysphagia to both solids and liquids. She also develops diarrhea after meals without notice of any specific foods triggering the diarrhea. TSH 2.55, vitamin B12 938, celiac panel, folate, and fecal leukocytes pending.     Impression:  Dysphagia  Postprandial diarrhea  History of esophageal stricture   Adrenal insufficiency  History of ruptured appendix  S/p cholecystectomy     Plan:  Recommend outpatient follow up with [facility] in 2-4 weeks to discuss EGD and colonoscopy. Message sent to our office staff to call and get her scheduled.   Recommend continuing to avoid foods that trigger her GERD. She can also try eliminating lactose and gluten from her diet to see if this helps her diarrheal symptoms. Postprandial diarrhea could possibly be related to post cholecystectomy status as well.   At the patient's request, I contacted her mother Macrina Cain at [phone] to discuss this plan and she expresses agreement.     Subjective:   Chief Complaint: dysphagia, postprandial diarrhea    HPI: Ms. Manzano is a 29-year-old female with past medical history of adrenal insufficiency on hydrocortisone, history of ruptured appendix several years ago, being evaluated for dysphagia and postprandial diarrhea.     She had an EGD performed several  years ago with dilation of a stricture. Her dysphagia was initially improved after that but has gradually gotten worse. She now has intermittent dysphagia to solid and some liquids. She has history of GERD which she manages with diet modifications, avoiding dairy, fried foods, and spicy foods, no current medication. She describe the dysphagia as retrosternal pain and pressure when attempting to swallow which sometimes improves with drinking liquids but sometimes leads to regurgitation.     She is repeatedly having diarrhea after she eats, and this has been an ongoing issue for her since her appendix ruptured several years ago. She is also s/p cholecystectomy. She has tried to identify specific foods that make the diarrhea worse, but it seems to happen with everything she eats. Denies hematochezia.     ROS:  The previous review of systems on initial consultation / H&P is noted and reviewed.  Specific changes noted above in HPI.    Current Medications:     Current Facility-Administered Medications   Medication Dose Route Frequency    hydrocortisone sodium succinate PF (SOLU-CORTEF) 100 mg in sterile water 2 mL injection  100 mg IntraVENous Q12H    pantoprazole (PROTONIX) tablet 40 mg  40 mg Oral QAM AC    0.9 % sodium chloride infusion   IntraVENous Continuous    sodium chloride flush 0.9 % injection 5-40 mL  5-40 mL IntraVENous 2 times per day    sodium chloride flush 0.9 % injection 5-40 mL  5-40 mL IntraVENous PRN    0.9 % sodium chloride infusion   IntraVENous PRN    ondansetron (ZOFRAN) injection 4 mg  4 mg IntraVENous Q6H PRN    polyethylene glycol (GLYCOLAX) packet 17 g  17 g Oral Daily PRN    acetaminophen (TYLENOL) tablet 650 mg  650 mg Oral Q6H PRN    Or    acetaminophen (TYLENOL) suppository 650 mg  650 mg Rectal Q6H PRN       Objective:     VITALS:   Last 24hrs VS reviewed since prior progress note. Most recent are:  Vitals:    02/22/24 1306   BP: 109/72   Pulse: 72   Resp:    Temp:    SpO2:      Temp

## 2024-02-22 NOTE — PROGRESS NOTES
[facility]  [address] [place]  [address] [phone]         Hospitalist Progress Note        NAME:  Yasemin Manzano   :  1995   MRN:  [id number]    Date/Time:  2024     Patient PCP:  Jacquie Muller PA-C    Code Status:  Full Code     Isolation Precautions: No active isolations    Barrier(s) to discharge: Currently not medically stable for discharge  Estimated date of discharge: 1-2 days  Discharge disposition:  Home with family    Assessment/Plan:      Syncope and collapse POA: recurrent and concerning for seizure activity vs Arapahoe's disease. Started after giving birth 2023 when she was diagnosed with adrenal insufficiency. EKG is normal. Troponin and TSH normal. BP low normal. Echo nL EF 55-60%. EEG Unremarkable. Continue IV fluids, IV Lorazepam PRN. Neuro and caridology notes reviewed. Findings discussed with neurology who doesn't feel its true Sz.     Adrenal insufficiency POA: she is followed by Endocrinology and most recently Dr Jazmine Win on . MRI of the brain with no acute changes. Was going to DC today but need to taper before DC. Hopefully tomorrow evening after last 50 mg dose.     Esophageal dysphagia to solid foods/ ?hx esophageal dilation POA: GI note reviewed recommending soft mechanical diet for now.  Consider outpatient endoscopy with esophageal biopsy and dilatation.  Started omeprazole for either GERD, nonerosive reflux or symptomatic esophagitis as potential causes esophageal solid food dysphagia.    Chronic proximal pharyngeal diarrhea: GI following.  Ordered fecal leukocyte and if positive outpatient colonoscopy once acute issues are more stable.  Also check celiac serology and consider lactose and gluten-free diet.  Follow iron studies and B12/folate studies.     Underweight (18.4 or below): Body mass index is 18.14 kg/m².:  Weight gain advised      F:  NS 75 ml/hr  E:  Monitor  N:  ADULT DIET; Dysphagia - Soft and Bite

## 2024-02-23 VITALS
HEART RATE: 78 BPM | RESPIRATION RATE: 19 BRPM | TEMPERATURE: 98.4 F | HEIGHT: 61 IN | DIASTOLIC BLOOD PRESSURE: 62 MMHG | WEIGHT: 96 LBS | SYSTOLIC BLOOD PRESSURE: 102 MMHG | OXYGEN SATURATION: 97 % | BODY MASS INDEX: 18.12 KG/M2

## 2024-02-23 LAB
FOLATE SERPL-MCNC: >20 NG/ML
GLIADIN PEPTIDE IGA SER-ACNC: 4 UNITS (ref 0–19)
GLIADIN PEPTIDE IGG SER-ACNC: 4 UNITS (ref 0–19)
IGA SERPL-MCNC: 192 MG/DL (ref 87–352)
TTG IGA SER-ACNC: <2 U/ML (ref 0–3)
TTG IGG SER-ACNC: <2 U/ML (ref 0–5)
WBC #/AREA STL HPF: NORMAL /HPF (ref 0–4)

## 2024-02-23 PROCEDURE — 2580000003 HC RX 258: Performed by: INTERNAL MEDICINE

## 2024-02-23 PROCEDURE — 6360000002 HC RX W HCPCS: Performed by: HOSPITALIST

## 2024-02-23 PROCEDURE — G0378 HOSPITAL OBSERVATION PER HR: HCPCS

## 2024-02-23 PROCEDURE — 96376 TX/PRO/DX INJ SAME DRUG ADON: CPT

## 2024-02-23 PROCEDURE — 2580000003 HC RX 258: Performed by: HOSPITALIST

## 2024-02-23 PROCEDURE — 94761 N-INVAS EAR/PLS OXIMETRY MLT: CPT

## 2024-02-23 PROCEDURE — 96361 HYDRATE IV INFUSION ADD-ON: CPT

## 2024-02-23 PROCEDURE — 6370000000 HC RX 637 (ALT 250 FOR IP): Performed by: INTERNAL MEDICINE

## 2024-02-23 RX ADMIN — WATER 100 MG: 1 INJECTION INTRAMUSCULAR; INTRAVENOUS; SUBCUTANEOUS at 08:21

## 2024-02-23 RX ADMIN — SODIUM CHLORIDE, PRESERVATIVE FREE 10 ML: 5 INJECTION INTRAVENOUS at 08:21

## 2024-02-23 RX ADMIN — WATER 100 MG: 1 INJECTION INTRAMUSCULAR; INTRAVENOUS; SUBCUTANEOUS at 17:36

## 2024-02-23 RX ADMIN — PANTOPRAZOLE SODIUM 40 MG: 40 TABLET, DELAYED RELEASE ORAL at 06:52

## 2024-02-23 NOTE — PROGRESS NOTES
[facility]  [address] [place], [place]  [id number] [phone]        2/23/2024      Patient: Yasemin Manzano    Admit date: 2/19/2024  Discharge date: 2/23/2024    Was hospitalized and discharged on the dates listed above.    May return to Work:   3/04/2024  if cleared by her PCP  Restrictions:  To be determined by PCP and physcial therapy    Written at the request of the Patient.  If you have any questions or concerns, please do not hesitate to contact our office.      Sincerely,          Sushant Davila MD  [facility] Staff Physician        Please Note: This letter is now part of Yasemin Manzano's medical chart and has been signed electronically, therefore, not requiring my actual handwritten signature.

## 2024-02-23 NOTE — DISCHARGE INSTRUCTIONS
Patient Discharge Instructions    Yasemin Manzano / [id number] : 1995    Admitted 2024 Discharged: 2024       Ms. Manzano, Below are your discharge instructions.      Discharge Medications:   It is important that you take the medication exactly as they are prescribed.   Keep your medication in the bottles provided by the pharmacist and keep a list of the medication names, dosages, and times to be taken in your wallet.   Do not take other medications without consulting your doctor.   Call your PCP for medication refills    What to do at Home  Take medications as prescribed and follow up with PCP, Cardiology, and Neurology.  Call your PCP for refills of your medications.    Recommended Diet: regular diet ADULT DIET; Dysphagia - Soft and Bite Sized; Lactose-Controlled, Gluten Free  DIET ONE TIME MESSAGE;  ADULT ORAL NUTRITION SUPPLEMENT; Breakfast, Dinner; Plant Based Oral Supplement     Recommended Activity: Activity as tolerated and No driving for at least 6 months and only after clearance by PCP, neurology and cardiology.    **If you experience any of the following symptoms chest pain, shortness of breath, fevers, chills, and bleeding, please follow up with PCP or go to the nearest ER.**      Regarding Driving:  It is the policy of the Department of Motor Vehicles, based on guidance and recommendations from the Medical Advisory Board, that drivers who have a diagnosis of vaso-vagal syncope, will have their privilege to operate a motor vehicle suspended for a period of six months from the date of the event.    A vaso-vagal syncope can be a loss of consciousness due to a clear, inciting event such as the sight of blood, extreme pain or coughing that can be well defined, identified, and agreed upon by the Medical Advisory Board based on the information received by [facility].    Vaso-vagal responses are not well understood. Often the treating physician will diagnose vaso-vagal response or syncope when the  results of all administered tests return as negative or inconclusive; hence, the six-month waiting period before driving. The longer a  goes without another blackout, the less likely another blackout will occur.    In cases where the  insists on driving prior to the six-month wait, the Medical Advisory Board has recommended that the  have a full neurological work-up done by a neurologist and a full cardiology work-up done by an electro-physiologist. The tests run by the neurologist and electro-physiologist may include but are not limited to, EEG, ECG, tilt-table test, and the wearing of a Holter or Event Monitor to capture irregular and/or dangerous arrhythmias.    These cases may be reviewed by the Medical Advisory Board. It is the contention of the Board that failure to determine a cause of the vaso-vagal syncope does not mean that there is no cause for the event.    If the  has had the work-up and tests and no cause was found, the  will remain suspended until he/she has been free of syncopal or loss of consciousness events for a period of six months.    If a clear cause is identified, the  has received treatment, and future risk of reoccurrence has been mitigated, the  may be allowed to return to driving, depending upon the information provided.    Additional Requirements  [facility] may impose additional requirements on the individual depending on the information received by the agency.             Sushant Davila MD     February 22, 2024

## 2024-02-23 NOTE — DISCHARGE SUMMARY
unremarkable.    Imaging  Echo (TTE) complete (PRN contrast/bubble/strain/3D)    Result Date: 2/21/2024    Left Ventricle: Normal left ventricular systolic function with a visually estimated EF of 55 - 60%. Left ventricle size is normal. Normal wall thickness. Normal wall motion. Normal diastolic function.   Interatrial Septum: No interatrial shunt visualized with color Doppler. Agitated saline study was negative with and without provocation.   Image quality is good. Procedure performed with the patient in a supine position.     MRI BRAIN W WO CONTRAST    Result Date: 2/20/2024  EXAM:  MRI BRAIN W WO CONTRAST INDICATION:    syncope, adrenal insuff.  please assess pituitary gland COMPARISON:  None. CONTRAST: 8 ml ProHance. TECHNIQUE:  Multiplanar multisequence acquisition without and with contrast of the brain. Special attention to the sella FINDINGS: Diffusion imaging does not show an acute ischemic changes. Ventricular size is normal. There is no extra-axial fluid collection or hemorrhage or shift. There is no white matter disease. Flow-voids in major vessels at the base of the brain are present. No enhancing intracranial lesion or masses. Incidental mucosal thickening within the mastoid air cells bilaterally. Special attention to the sella show a normal size pituitary gland, midline infundibulum no mass.     1. Negative examination with special attention to the sella.    XR CHEST PORTABLE    Result Date: 2/19/2024  EXAM:  XR CHEST PORTABLE INDICATION: Syncope COMPARISON: none TECHNIQUE: portable chest AP view FINDINGS: The cardiac silhouette is within normal limits. The pulmonary vasculature is within normal limits. The lungs and pleural spaces are clear. The visualized bones and upper abdomen are age-appropriate.     No acute process on portable chest.     CT Head W/O Contrast    Result Date: 2/19/2024  EXAM: CT HEAD WO CONTRAST INDICATION: syncope COMPARISON: None. CONTRAST: None. TECHNIQUE: Unenhanced CT of the  driving for 6 months due to syncope    Diet: regular diet ADULT DIET; Dysphagia - Soft and Bite Sized; Lactose-Controlled, Gluten Free  DIET ONE TIME MESSAGE;  ADULT ORAL NUTRITION SUPPLEMENT; Breakfast, Dinner; Plant Based Oral Supplement     Wound Care:  None    Follow-up(s):  Follow-up Information       Follow up With Specialties Details Why Contact Info    Dania Russ APRN - NP Nurse Practitioner Acute Care, Cardiology Follow up on 3/21/2024 10:20 am [id number] [facility]  [address]  [facility] [id number] [phone]      Saba Mancilla APRN - NP Nurse Practitioner Follow up in 1 month(s) Will need clearance to drive in 6 months [id number] [facility] [id number] [phone]      Deborah Li APRN - NP Nurse Practitioner, Cardiology Schedule an appointment as soon as possible for a visit in 2 month(s) Will need clearance to drive in 6 months [address] [address] [phone]      Your mental health therapist  Follow up      Jose Looney MD Gastroenterology Schedule an appointment as soon as possible for a visit in 2 week(s) For possible EGD [address] [facility] [address]             Current Discharge Medication List        START taking these medications    Details   !! hydrocortisone (CORTEF) 10 MG tablet Take 5 tablets by mouth daily for 2 days, THEN 4 tablets daily for 2 days, THEN 3 tablets daily for 2 days, THEN 2 tablets daily for 2 days. After completing 2nd day of 20 mg resume your normal 10 mg daily dose.  Qty: 28 tablet, Refills: 0      escitalopram (LEXAPRO) 10 MG tablet Take 1 tablet by mouth daily  Qty: 30 tablet, Refills: 0       !! - Potential duplicate medications found. Please discuss with provider.        CONTINUE these medications which have CHANGED    Details   pantoprazole (PROTONIX) 40 MG tablet Take 1 tablet by mouth every morning (before breakfast)  Qty: 30 tablet, Refills: 0           CONTINUE these medications which have

## 2024-02-23 NOTE — CARE COORDINATION
02/23/24  4:12 PM  [facility] has accepted and will reach out to patient with SOC information.     ________________________________________________________          02/23/24  2:36 PM  All HH referrals have declined. Additional referrals sent in Allscripts. Patient has been observed ambulating in [place] unassisted with visitor. If unable to secure HH due to insurance patient may need an outpatient script for therapy. CM to follow.      _________________________________________________________          02/23/24  12:52 PM  This CM spoke to patient's mother Macrina who agrees with HH at discharge. [facility] referral sent in [employer]. CM to follow for accepting agency.       __________________________________________________________             Initial Case Management Assessment       02/23/24 1042   Service Assessment   Patient Orientation Alert and Oriented   Cognition Alert   History Provided By Patient   Primary Caregiver Self   Support Systems Parent  (friends)   Patient's Healthcare Decision Maker is: Legal Next of Kin   PCP Verified by CM Yes  (EDGARDO Albert)   Last Visit to PCP Within last 3 months   Prior Functional Level Independent in ADLs/IADLs   Can patient return to prior living arrangement Yes   Ability to make needs known: Good   Family able to assist with home care needs: Yes   Would you like for me to discuss the discharge plan with any other family members/significant others, and if so, who? Yes  (mother Macrina Cain [phone])   Financial Resources Medicaid   Community Resources None   Social/Functional History   Lives With Friend(s)   Type of Home House   Home Layout Two level   Home Access Stairs to enter with rails   Entrance Stairs - Number of Steps 5   Bathroom Equipment Grab bars in shower   Home Equipment None   Receives Help From Family;Friend(s)   ADL Assistance Independent   Homemaking Assistance Independent   Ambulation Assistance Independent   Transfer Assistance Independent

## 2024-02-23 NOTE — PROGRESS NOTES
Jossie Cespedes PA-C                       [phone] cell              Monday-Friday 8:00 am-4:30 pm  I am not permitted to use \"perfect serve\" use above for contact, thanks.        Gastroenterology Progress Note    February 23, 2024  Admit Date: 2/19/2024         Narrative Assessment and Plan   29YOF with history of adrenal insufficiency on hydrocortisone, history of ruptured appendix several years ago, s/p cholecystectomy, and history of esophageal stricture dilated on EGD several years ago, being evaluated for dysphagia and postprandial diarrhea. She initially ha resolution of dysphagia after dilation, but her symptoms have slowly returned and she has intermittent dysphagia to both solids and liquids. She also develops diarrhea after meals without notice of any specific foods triggering the diarrhea. She attempts to avoid gluten and lactose at home already but says she is not perfect with avoiding gluten. TSH 2.55, vitamin B12 938, celiac panel negative likely true negative given some exposure to gluten, folate and fecal leukocytes pending.     Impression:  Dysphagia  Postprandial diarrhea  History of esophageal stricture   Adrenal insufficiency  History of ruptured appendix  S/p cholecystectomy     Plan:  Recommend outpatient follow up with [facility] in 2-4 weeks to discuss her symptoms, given recent EGD and colon for similar complaints at [facility] in October 2023, she may not need more procedures immediately but would benefit from chronic monitoring and management. Message sent to our office staff to call and get her scheduled. Information for our office provided to the patient at today's visit.   Recommend continuing to avoid foods that trigger her GERD and diarrhea, including gluten and lactose.  Can continue on pantoprazole at discharge.     Subjective:   Chief Complaint: dysphagia, postprandial diarrhea    HPI: Ms.  normal.  External hemorrhoids were found. The hemorrhoids were small.  The exam was otherwise without abnormality on direct and retroflexion views.    Pathology Results:   FINAL DIAGNOSIS:                 A. DUODENUM, BIOPSY:       NO PATHOLOGIC ABNORMALITY.    B. COLON, RANDOM, BIOPSIES:        COLONIC MUCOSA WITH NO PATHOLOGIC ABNORMALITY.            NO EVIDENCE OF LYMPHOCYTIC OR COLLAGENOUS COLITIS.     ROS:  The previous review of systems on initial consultation / H&P is noted and reviewed.  Specific changes noted above in HPI.    Current Medications:     Current Facility-Administered Medications   Medication Dose Route Frequency    hydrocortisone sodium succinate PF (SOLU-CORTEF) 100 mg in sterile water 2 mL injection  100 mg IntraVENous Q12H    pantoprazole (PROTONIX) tablet 40 mg  40 mg Oral QAM AC    0.9 % sodium chloride infusion   IntraVENous Continuous    sodium chloride flush 0.9 % injection 5-40 mL  5-40 mL IntraVENous 2 times per day    sodium chloride flush 0.9 % injection 5-40 mL  5-40 mL IntraVENous PRN    0.9 % sodium chloride infusion   IntraVENous PRN    ondansetron (ZOFRAN) injection 4 mg  4 mg IntraVENous Q6H PRN    polyethylene glycol (GLYCOLAX) packet 17 g  17 g Oral Daily PRN    acetaminophen (TYLENOL) tablet 650 mg  650 mg Oral Q6H PRN    Or    acetaminophen (TYLENOL) suppository 650 mg  650 mg Rectal Q6H PRN       Objective:     VITALS:   Last 24hrs VS reviewed since prior progress note. Most recent are:  Vitals:    24 1204   BP: 121/82   Pulse: 65   Resp: 15   Temp: 98.6 °F (37 °C)   SpO2: 99%     Temp (24hrs), Av.1 °F (36.7 °C), Min:97.3 °F (36.3 °C), Max:98.6 °F (37 °C)    No intake or output data in the 24 hours ending 24 1411    EXAM:  General:  Comfortable, no distress    HEENT:  Atraumatic skull, pupils equal  Lungs:   No increased work of breathing or cough.  Speaking in complete sentences  Musc:   No skeletal defects or deformities  Neurologic:   Cranial nerves

## 2024-02-26 ENCOUNTER — TELEPHONE (OUTPATIENT)
Age: 29
End: 2024-02-26

## 2024-02-26 NOTE — TELEPHONE ENCOUNTER
Was her visit completed before going to ER or does she need to be rescheduled as new patient again? How soon for return visit or need work in?

## 2024-02-26 NOTE — TELEPHONE ENCOUNTER
She needs ACTH stimulation test at the infusion center, faxed the order sheet already    Need to see if that has been scheduled    She was told to hold the hydrocortisone the morning of the test and after completing the test she can take the hydrocortisone    Has a follow-up

## 2024-02-28 LAB — ECHO BSA: 1.37 M2

## 2024-02-28 NOTE — TELEPHONE ENCOUNTER
Pt is scheduled. LVM reminding pt to hold AM dose of Hydrocortisone the morning of infusion and to take after completing test. Callback number was left fr any questions or concerns.

## 2024-02-28 NOTE — PROGRESS NOTES
Physician Progress Note      PATIENT:               FANI POWER  CSN #:                  [id number]  :                       1995  ADMIT DATE:       2024 3:58 PM  DISCH DATE:        2024 7:39 PM  RESPONDING  PROVIDER #:        Sushant Davila MD          QUERY TEXT:    Patient admitted with syncope. Noted to have \"moderate malnutrition\"   documented in  RD assessment. If possible, please document in progress   notes and discharge summary if you are evaluating and /or treating any of the   following:    The medical record reflects the following:  Risk Factors: dysphagia, chronic diarrhea  Clinical Indicators:  RD: Malnutrition Status:  Moderate malnutrition   (24 1213)  Context:  Acute Illness  Findings of the 6 clinical characteristics of malnutrition:  Energy Intake:  75% or less of estimated energy requirements for 7 or more   days  Body Fat Loss:  Mild body fat loss Fat Overlying Ribs  Muscle Mass Loss:  Mild muscle mass loss Thigh (quadriceps), Clavicles   (pectoralis & deltoids), Temples (temporalis)  Treatment: RD assessment, ONS with breakfast and dinner, monitoring    Thank you,    Rosa Ratliff RN  CDI      ASPEN Criteria:    https://aspenjournals.onlinelibrary.palomares.com/doi/full/10.1177/970056510620062  5  Options provided:  -- Protein calorie malnutrition moderate  -- Other - I will add my own diagnosis  -- Disagree - Not applicable / Not valid  -- Disagree - Clinically unable to determine / Unknown  -- Refer to Clinical Documentation Reviewer    PROVIDER RESPONSE TEXT:    This patient has moderate protein calorie malnutrition.    Query created by: Rosa Ratliff on 2024 9:40 AM      Electronically signed by:  Sushant Davila MD 2024 6:18 AM

## 2024-02-29 NOTE — PROGRESS NOTES
Physician Progress Note      PATIENT:               FANI POWER  CSN #:                  [id number]  :                       1995  ADMIT DATE:       2024 3:58 PM  DISCH DATE:        2024 7:39 PM  RESPONDING  PROVIDER #:        Sushant Davila MD          QUERY TEXT:    Patient admitted with syncope and seizure-like activity, noted to have adrenal   insufficiency. If possible, please document in progress notes and discharge   summary after study the etiology of the syncope:    The medical record reflects the following:  Risk Factors: adrenal insufficiency  Clinical Indicators: reports of profound weakness and syncope since giving   birth last April and diagnosed with adrenal insufficiency  -  Neuro notes psychogenic spells  - DCS: The patient was admitted with syncope and collapse and possible seizure   activity.  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  She was put on high-dose cortisone 100 mg IV 3 times a day.  Treatment: Neuro consult, PT, hydrocortisone IV    Thank you,    Rosa Ratliff RN  CDI  Options provided:  -- Syncope due to Condon's disease  -- Syncope due to psychogenic spells  -- Syncope due to other, Please specify.  -- Other - I will add my own diagnosis  -- Disagree - Not applicable / Not valid  -- Disagree - Clinically unable to determine / Unknown  -- Refer to Clinical Documentation Reviewer    PROVIDER RESPONSE TEXT:    Provider is clinically unable to determine a response to this query.    Query created by: Rosa Ratliff on 2024 3:10 PM      Electronically signed by:  Sushant Davila MD 2024 4:03 PM

## 2024-03-05 PROBLEM — R79.89 HYPOURICEMIA: Status: ACTIVE | Noted: 2024-03-05

## 2024-03-05 RX ORDER — SODIUM CHLORIDE 9 MG/ML
5-250 INJECTION, SOLUTION INTRAVENOUS PRN
Status: CANCELLED | OUTPATIENT
Start: 2024-03-12

## 2024-03-12 ENCOUNTER — TELEPHONE (OUTPATIENT)
Age: 29
End: 2024-03-12

## 2024-03-12 ENCOUNTER — HOSPITAL ENCOUNTER (OUTPATIENT)
Facility: HOSPITAL | Age: 29
Setting detail: INFUSION SERIES
Discharge: HOME | End: 2024-03-12
Payer: MEDICAID

## 2024-03-12 VITALS
TEMPERATURE: 97.8 F | RESPIRATION RATE: 18 BRPM | DIASTOLIC BLOOD PRESSURE: 87 MMHG | HEART RATE: 63 BPM | SYSTOLIC BLOOD PRESSURE: 120 MMHG

## 2024-03-12 DIAGNOSIS — R79.89 HYPOURICEMIA: Primary | ICD-10-CM

## 2024-03-12 LAB
CORTIS SERPL-MCNC: 22.4 UG/DL
CORTIS SERPL-MCNC: 29.8 UG/DL
CORTIS SERPL-MCNC: 7.5 UG/DL

## 2024-03-12 PROCEDURE — 2580000003 HC RX 258: Performed by: INTERNAL MEDICINE

## 2024-03-12 PROCEDURE — 82533 TOTAL CORTISOL: CPT

## 2024-03-12 PROCEDURE — 36415 COLL VENOUS BLD VENIPUNCTURE: CPT

## 2024-03-12 PROCEDURE — 82088 ASSAY OF ALDOSTERONE: CPT

## 2024-03-12 PROCEDURE — A4216 STERILE WATER/SALINE, 10 ML: HCPCS | Performed by: INTERNAL MEDICINE

## 2024-03-12 PROCEDURE — 96374 THER/PROPH/DIAG INJ IV PUSH: CPT

## 2024-03-12 PROCEDURE — 6360000002 HC RX W HCPCS: Performed by: INTERNAL MEDICINE

## 2024-03-12 PROCEDURE — 82024 ASSAY OF ACTH: CPT

## 2024-03-12 PROCEDURE — 84244 ASSAY OF RENIN: CPT

## 2024-03-12 RX ORDER — SODIUM CHLORIDE 9 MG/ML
5-250 INJECTION, SOLUTION INTRAVENOUS PRN
Status: DISCONTINUED | OUTPATIENT
Start: 2024-03-12 | End: 2024-03-13 | Stop reason: HOSPADM

## 2024-03-12 RX ORDER — SODIUM CHLORIDE 9 MG/ML
5-250 INJECTION, SOLUTION INTRAVENOUS PRN
Status: CANCELLED | OUTPATIENT
Start: 2024-03-12

## 2024-03-12 RX ADMIN — SODIUM CHLORIDE 250 MCG: 9 INJECTION INTRAMUSCULAR; INTRAVENOUS; SUBCUTANEOUS at 09:18

## 2024-03-12 RX ADMIN — SODIUM CHLORIDE 25 ML/HR: 9 INJECTION, SOLUTION INTRAVENOUS at 09:17

## 2024-03-12 ASSESSMENT — PAIN SCALES - GENERAL: PAINLEVEL_OUTOF10: 0

## 2024-03-12 NOTE — PROGRESS NOTES
[facility] Peds/Adult Note                       Date: 2024    Name: Yasemin Manzano    MRN: [id number]         : 1995    0900 Patient arrives for Cortisol stim test  without acute problems. Please see Epic for complete assessment and education provided.    Vital signs stable throughout and prior to discharge. Patient tolerated procedure well and was discharged without incident. Patient is aware of no further [facility] appointments and to follow up with referring provider for any questions or concern.       Ms. Manzano's vitals were reviewed prior to and after treatment.   Patient Vitals for the past 12 hrs:   Temp Pulse Resp BP   24 0932 -- 63 18 120/87   24 0854 97.8 °F (36.6 °C) 67 18 109/78       Medications given:   Medications Administered         0.9 % sodium chloride infusion Admin Date  2024 Action  New Bag Dose  25 mL/hr Rate  25 mL/hr Route  IntraVENous Administered By  Augusto Flor RN        cosyntropin (CORTROSYN) 250 mcg in sodium chloride (PF) 0.9 % 2 mL syringe Admin Date  2024 Action  Given Dose  250 mcg Rate   Route  IntraVENous Administered By  Augusto Flor RN            Ms. Manzano tolerated the test, and had no complaints.    [facility]. Manz[facility] condition. Discharge Instructions provided to patient, patient verbalized understanding but denied the request for a copy of after visit summary.     Future Appointments   Date Time Provider Department Center   3/19/2024  9:15 AM Audelia Lucero MD CDE [facility] AMB   3/21/2024 10:20 AM Dania Russ APRN - NP [facility] AMB       AUGUSTO FLOR RN  2024  10:42 AM  0900

## 2024-03-12 NOTE — TELEPHONE ENCOUNTER
Infusion center called stating pt is there for stim test. Pt is supposed to be NPO but had Powerade this AM. They want to know if it is ok to proceed. Per Dr. Lucero advised it is ok to proceed, but verify pt has not been on any steroids in the last 3 weeks first.

## 2024-03-13 LAB — ACTH PLAS-MCNC: 8.1 PG/ML (ref 7.2–63.3)

## 2024-03-17 LAB
ALDOST SERPL-MCNC: 13.7 NG/DL (ref 0–30)
RENIN PLAS-CCNC: 0.52 NG/ML/HR (ref 0.17–5.38)

## 2024-03-19 ENCOUNTER — OFFICE VISIT (OUTPATIENT)
Age: 29
End: 2024-03-19
Payer: MEDICAID

## 2024-03-19 VITALS
HEART RATE: 68 BPM | SYSTOLIC BLOOD PRESSURE: 104 MMHG | BODY MASS INDEX: 17.79 KG/M2 | TEMPERATURE: 97.8 F | OXYGEN SATURATION: 99 % | DIASTOLIC BLOOD PRESSURE: 72 MMHG | WEIGHT: 94.2 LBS | HEIGHT: 61 IN

## 2024-03-19 DIAGNOSIS — R53.1 WEAKNESS: Primary | ICD-10-CM

## 2024-03-19 PROCEDURE — 99214 OFFICE O/P EST MOD 30 MIN: CPT | Performed by: INTERNAL MEDICINE

## 2024-03-19 NOTE — PROGRESS NOTES
attempts were made to correct all the mistakes, some may have been missed and remained in the body of the report.

## 2024-04-09 LAB — ECHO BSA: 1.37 M2

## 2024-04-09 PROCEDURE — 93272 ECG/REVIEW INTERPRET ONLY: CPT | Performed by: INTERNAL MEDICINE

## 2024-07-02 NOTE — FLOWSHEET NOTE
- Friday 8a - 11am. There are no Saturday or Sunday swabbing at any [facility] facility. (patient verbalizes understanding) not applicable

## 2024-07-08 ENCOUNTER — ANESTHESIA (OUTPATIENT)
Facility: HOSPITAL | Age: 29
DRG: 918 | End: 2024-07-08
Payer: MEDICAID

## 2024-07-08 ENCOUNTER — HOSPITAL ENCOUNTER (INPATIENT)
Facility: HOSPITAL | Age: 29
LOS: 1 days | Discharge: HOME | DRG: 918 | End: 2024-07-09
Attending: INTERNAL MEDICINE | Admitting: FAMILY MEDICINE
Payer: MEDICAID

## 2024-07-08 ENCOUNTER — ANESTHESIA EVENT (OUTPATIENT)
Facility: HOSPITAL | Age: 29
DRG: 918 | End: 2024-07-08
Payer: MEDICAID

## 2024-07-08 DIAGNOSIS — R56.9 OBSERVED SEIZURE-LIKE ACTIVITY (HCC): Primary | ICD-10-CM

## 2024-07-08 DIAGNOSIS — R55 SYNCOPE AND COLLAPSE: ICD-10-CM

## 2024-07-08 PROBLEM — T88.59XA: Status: ACTIVE | Noted: 2024-07-08

## 2024-07-08 LAB
ALBUMIN SERPL-MCNC: 3 G/DL (ref 3.5–5)
ALBUMIN/GLOB SERPL: 1 (ref 1.1–2.2)
ALP SERPL-CCNC: 72 U/L (ref 45–117)
ALT SERPL-CCNC: 15 U/L (ref 12–78)
ANION GAP SERPL CALC-SCNC: 6 MMOL/L (ref 5–15)
AST SERPL-CCNC: 11 U/L (ref 15–37)
BASOPHILS # BLD: 0 K/UL (ref 0–0.1)
BASOPHILS NFR BLD: 0 % (ref 0–1)
BILIRUB SERPL-MCNC: 0.2 MG/DL (ref 0.2–1)
BUN SERPL-MCNC: 8 MG/DL (ref 6–20)
BUN/CREAT SERPL: 16 (ref 12–20)
CALCIUM SERPL-MCNC: 7.7 MG/DL (ref 8.5–10.1)
CHLORIDE SERPL-SCNC: 113 MMOL/L (ref 97–108)
CO2 SERPL-SCNC: 20 MMOL/L (ref 21–32)
CREAT SERPL-MCNC: 0.51 MG/DL (ref 0.55–1.02)
DIFFERENTIAL METHOD BLD: ABNORMAL
EOSINOPHIL # BLD: 0 K/UL (ref 0–0.4)
EOSINOPHIL NFR BLD: 0 % (ref 0–7)
ERYTHROCYTE [DISTWIDTH] IN BLOOD BY AUTOMATED COUNT: 13.2 % (ref 11.5–14.5)
GLOBULIN SER CALC-MCNC: 3.1 G/DL (ref 2–4)
GLUCOSE SERPL-MCNC: 69 MG/DL (ref 65–100)
HCG UR QL: NEGATIVE
HCT VFR BLD AUTO: 32.7 % (ref 35–47)
HGB BLD-MCNC: 11 G/DL (ref 11.5–16)
IMM GRANULOCYTES # BLD AUTO: 0 K/UL (ref 0–0.04)
IMM GRANULOCYTES NFR BLD AUTO: 0 % (ref 0–0.5)
LACTATE SERPL-SCNC: 0.8 MMOL/L (ref 0.4–2)
LYMPHOCYTES # BLD: 2.8 K/UL (ref 0.8–3.5)
LYMPHOCYTES NFR BLD: 38 % (ref 12–49)
MCH RBC QN AUTO: 29.4 PG (ref 26–34)
MCHC RBC AUTO-ENTMCNC: 33.6 G/DL (ref 30–36.5)
MCV RBC AUTO: 87.4 FL (ref 80–99)
MONOCYTES # BLD: 0.7 K/UL (ref 0–1)
MONOCYTES NFR BLD: 9 % (ref 5–13)
NEUTS SEG # BLD: 4 K/UL (ref 1.8–8)
NEUTS SEG NFR BLD: 53 % (ref 32–75)
NRBC # BLD: 0 K/UL (ref 0–0.01)
NRBC BLD-RTO: 0 PER 100 WBC
PLATELET # BLD AUTO: 223 K/UL (ref 150–400)
PMV BLD AUTO: 9.5 FL (ref 8.9–12.9)
POTASSIUM SERPL-SCNC: 3.9 MMOL/L (ref 3.5–5.1)
PROT SERPL-MCNC: 6.1 G/DL (ref 6.4–8.2)
RBC # BLD AUTO: 3.74 M/UL (ref 3.8–5.2)
SODIUM SERPL-SCNC: 139 MMOL/L (ref 136–145)
WBC # BLD AUTO: 7.5 K/UL (ref 3.6–11)

## 2024-07-08 PROCEDURE — 2580000003 HC RX 258: Performed by: FAMILY MEDICINE

## 2024-07-08 PROCEDURE — 3700000001 HC ADD 15 MINUTES (ANESTHESIA): Performed by: INTERNAL MEDICINE

## 2024-07-08 PROCEDURE — 36415 COLL VENOUS BLD VENIPUNCTURE: CPT

## 2024-07-08 PROCEDURE — 2700000000 HC OXYGEN THERAPY PER DAY

## 2024-07-08 PROCEDURE — 6370000000 HC RX 637 (ALT 250 FOR IP): Performed by: FAMILY MEDICINE

## 2024-07-08 PROCEDURE — 83605 ASSAY OF LACTIC ACID: CPT

## 2024-07-08 PROCEDURE — 3600007512: Performed by: INTERNAL MEDICINE

## 2024-07-08 PROCEDURE — 3600007502: Performed by: INTERNAL MEDICINE

## 2024-07-08 PROCEDURE — 88305 TISSUE EXAM BY PATHOLOGIST: CPT

## 2024-07-08 PROCEDURE — 2709999900 HC NON-CHARGEABLE SUPPLY: Performed by: INTERNAL MEDICINE

## 2024-07-08 PROCEDURE — 2500000003 HC RX 250 WO HCPCS: Performed by: NURSE ANESTHETIST, CERTIFIED REGISTERED

## 2024-07-08 PROCEDURE — 7100000010 HC PHASE II RECOVERY - FIRST 15 MIN: Performed by: INTERNAL MEDICINE

## 2024-07-08 PROCEDURE — 2000000000 HC ICU R&B

## 2024-07-08 PROCEDURE — 85025 COMPLETE CBC W/AUTO DIFF WBC: CPT

## 2024-07-08 PROCEDURE — 80053 COMPREHEN METABOLIC PANEL: CPT

## 2024-07-08 PROCEDURE — 2580000003 HC RX 258: Performed by: INTERNAL MEDICINE

## 2024-07-08 PROCEDURE — 81025 URINE PREGNANCY TEST: CPT

## 2024-07-08 PROCEDURE — 0DB28ZX EXCISION OF MIDDLE ESOPHAGUS, VIA NATURAL OR ARTIFICIAL OPENING ENDOSCOPIC, DIAGNOSTIC: ICD-10-PCS | Performed by: INTERNAL MEDICINE

## 2024-07-08 PROCEDURE — 0DB98ZX EXCISION OF DUODENUM, VIA NATURAL OR ARTIFICIAL OPENING ENDOSCOPIC, DIAGNOSTIC: ICD-10-PCS | Performed by: INTERNAL MEDICINE

## 2024-07-08 PROCEDURE — 94761 N-INVAS EAR/PLS OXIMETRY MLT: CPT

## 2024-07-08 PROCEDURE — 3700000000 HC ANESTHESIA ATTENDED CARE: Performed by: INTERNAL MEDICINE

## 2024-07-08 PROCEDURE — 2720000010 HC SURG SUPPLY STERILE: Performed by: INTERNAL MEDICINE

## 2024-07-08 PROCEDURE — 6360000002 HC RX W HCPCS: Performed by: ANESTHESIOLOGY

## 2024-07-08 PROCEDURE — 0DB78ZX EXCISION OF STOMACH, PYLORUS, VIA NATURAL OR ARTIFICIAL OPENING ENDOSCOPIC, DIAGNOSTIC: ICD-10-PCS | Performed by: INTERNAL MEDICINE

## 2024-07-08 PROCEDURE — 6360000002 HC RX W HCPCS: Performed by: NURSE ANESTHETIST, CERTIFIED REGISTERED

## 2024-07-08 PROCEDURE — 7100000011 HC PHASE II RECOVERY - ADDTL 15 MIN: Performed by: INTERNAL MEDICINE

## 2024-07-08 RX ORDER — ALBUTEROL SULFATE 90 UG/1
2 AEROSOL, METERED RESPIRATORY (INHALATION) EVERY 6 HOURS PRN
COMMUNITY

## 2024-07-08 RX ORDER — SODIUM CHLORIDE 0.9 % (FLUSH) 0.9 %
5-40 SYRINGE (ML) INJECTION PRN
Status: DISCONTINUED | OUTPATIENT
Start: 2024-07-08 | End: 2024-07-09 | Stop reason: HOSPADM

## 2024-07-08 RX ORDER — SODIUM CHLORIDE 0.9 % (FLUSH) 0.9 %
5-40 SYRINGE (ML) INJECTION PRN
Status: DISCONTINUED | OUTPATIENT
Start: 2024-07-08 | End: 2024-07-08 | Stop reason: HOSPADM

## 2024-07-08 RX ORDER — ONDANSETRON 2 MG/ML
4 INJECTION INTRAMUSCULAR; INTRAVENOUS EVERY 6 HOURS PRN
Status: DISCONTINUED | OUTPATIENT
Start: 2024-07-08 | End: 2024-07-08

## 2024-07-08 RX ORDER — ACETAMINOPHEN 650 MG/1
650 SUPPOSITORY RECTAL EVERY 6 HOURS PRN
Status: DISCONTINUED | OUTPATIENT
Start: 2024-07-08 | End: 2024-07-09 | Stop reason: HOSPADM

## 2024-07-08 RX ORDER — SODIUM CHLORIDE 9 MG/ML
INJECTION, SOLUTION INTRAVENOUS PRN
Status: DISCONTINUED | OUTPATIENT
Start: 2024-07-08 | End: 2024-07-09 | Stop reason: HOSPADM

## 2024-07-08 RX ORDER — ACETAMINOPHEN 325 MG/1
650 TABLET ORAL EVERY 6 HOURS PRN
Status: DISCONTINUED | OUTPATIENT
Start: 2024-07-08 | End: 2024-07-09 | Stop reason: HOSPADM

## 2024-07-08 RX ORDER — DIAZEPAM 5 MG/ML
5 INJECTION, SOLUTION INTRAMUSCULAR; INTRAVENOUS ONCE
Status: DISCONTINUED | OUTPATIENT
Start: 2024-07-08 | End: 2024-07-09 | Stop reason: HOSPADM

## 2024-07-08 RX ORDER — MIDAZOLAM HYDROCHLORIDE 2 MG/2ML
2 INJECTION, SOLUTION INTRAMUSCULAR; INTRAVENOUS ONCE
Status: COMPLETED | OUTPATIENT
Start: 2024-07-08 | End: 2024-07-08

## 2024-07-08 RX ORDER — MIDAZOLAM HYDROCHLORIDE 1 MG/ML
INJECTION INTRAMUSCULAR; INTRAVENOUS PRN
Status: DISCONTINUED | OUTPATIENT
Start: 2024-07-08 | End: 2024-07-08 | Stop reason: SDUPTHER

## 2024-07-08 RX ORDER — SODIUM CHLORIDE 0.9 % (FLUSH) 0.9 %
5-40 SYRINGE (ML) INJECTION EVERY 12 HOURS SCHEDULED
Status: DISCONTINUED | OUTPATIENT
Start: 2024-07-08 | End: 2024-07-09 | Stop reason: HOSPADM

## 2024-07-08 RX ORDER — LORAZEPAM 2 MG/ML
1 INJECTION INTRAMUSCULAR ONCE
Status: COMPLETED | OUTPATIENT
Start: 2024-07-08 | End: 2024-07-08

## 2024-07-08 RX ORDER — MIDAZOLAM HYDROCHLORIDE 1 MG/ML
INJECTION INTRAMUSCULAR; INTRAVENOUS
Status: COMPLETED
Start: 2024-07-08 | End: 2024-07-08

## 2024-07-08 RX ORDER — FLUOXETINE 10 MG/1
10 CAPSULE ORAL DAILY
COMMUNITY

## 2024-07-08 RX ORDER — ONDANSETRON 2 MG/ML
4 INJECTION INTRAMUSCULAR; INTRAVENOUS EVERY 6 HOURS PRN
Status: DISCONTINUED | OUTPATIENT
Start: 2024-07-08 | End: 2024-07-09 | Stop reason: HOSPADM

## 2024-07-08 RX ORDER — POLYETHYLENE GLYCOL 3350 17 G/17G
17 POWDER, FOR SOLUTION ORAL DAILY PRN
Status: DISCONTINUED | OUTPATIENT
Start: 2024-07-08 | End: 2024-07-09 | Stop reason: HOSPADM

## 2024-07-08 RX ORDER — PROPOFOL 10 MG/ML
INJECTION, EMULSION INTRAVENOUS PRN
Status: DISCONTINUED | OUTPATIENT
Start: 2024-07-08 | End: 2024-07-08 | Stop reason: SDUPTHER

## 2024-07-08 RX ORDER — ONDANSETRON 4 MG/1
4 TABLET, ORALLY DISINTEGRATING ORAL EVERY 8 HOURS PRN
Status: DISCONTINUED | OUTPATIENT
Start: 2024-07-08 | End: 2024-07-09 | Stop reason: HOSPADM

## 2024-07-08 RX ORDER — ONDANSETRON 4 MG/1
4 TABLET, ORALLY DISINTEGRATING ORAL EVERY 8 HOURS PRN
Status: DISCONTINUED | OUTPATIENT
Start: 2024-07-08 | End: 2024-07-08

## 2024-07-08 RX ORDER — SODIUM CHLORIDE 0.9 % (FLUSH) 0.9 %
5-40 SYRINGE (ML) INJECTION EVERY 12 HOURS SCHEDULED
Status: DISCONTINUED | OUTPATIENT
Start: 2024-07-08 | End: 2024-07-08 | Stop reason: HOSPADM

## 2024-07-08 RX ORDER — BUTALBITAL, ACETAMINOPHEN AND CAFFEINE 50; 325; 40 MG/1; MG/1; MG/1
1 TABLET ORAL EVERY 4 HOURS PRN
Status: DISCONTINUED | OUTPATIENT
Start: 2024-07-08 | End: 2024-07-09 | Stop reason: HOSPADM

## 2024-07-08 RX ORDER — SODIUM CHLORIDE, SODIUM LACTATE, POTASSIUM CHLORIDE, CALCIUM CHLORIDE 600; 310; 30; 20 MG/100ML; MG/100ML; MG/100ML; MG/100ML
INJECTION, SOLUTION INTRAVENOUS CONTINUOUS
Status: DISCONTINUED | OUTPATIENT
Start: 2024-07-08 | End: 2024-07-09 | Stop reason: HOSPADM

## 2024-07-08 RX ORDER — SODIUM CHLORIDE 9 MG/ML
25 INJECTION, SOLUTION INTRAVENOUS PRN
Status: DISCONTINUED | OUTPATIENT
Start: 2024-07-08 | End: 2024-07-08 | Stop reason: HOSPADM

## 2024-07-08 RX ADMIN — PROPOFOL 50 MG: 10 INJECTION, EMULSION INTRAVENOUS at 10:54

## 2024-07-08 RX ADMIN — MIDAZOLAM HYDROCHLORIDE 2 MG: 1 INJECTION, SOLUTION INTRAMUSCULAR; INTRAVENOUS at 12:48

## 2024-07-08 RX ADMIN — BUTALBITAL, ACETAMINOPHEN, AND CAFFEINE 1 TABLET: 50; 325; 40 TABLET ORAL at 17:58

## 2024-07-08 RX ADMIN — PROPOFOL 50 MG: 10 INJECTION, EMULSION INTRAVENOUS at 10:56

## 2024-07-08 RX ADMIN — SODIUM CHLORIDE, POTASSIUM CHLORIDE, SODIUM LACTATE AND CALCIUM CHLORIDE: 600; 310; 30; 20 INJECTION, SOLUTION INTRAVENOUS at 23:00

## 2024-07-08 RX ADMIN — PROPOFOL 50 MG: 10 INJECTION, EMULSION INTRAVENOUS at 10:58

## 2024-07-08 RX ADMIN — SODIUM CHLORIDE: 9 INJECTION, SOLUTION INTRAVENOUS at 10:59

## 2024-07-08 RX ADMIN — SODIUM CHLORIDE, POTASSIUM CHLORIDE, SODIUM LACTATE AND CALCIUM CHLORIDE: 600; 310; 30; 20 INJECTION, SOLUTION INTRAVENOUS at 18:51

## 2024-07-08 RX ADMIN — PROPOFOL 50 MG: 10 INJECTION, EMULSION INTRAVENOUS at 10:51

## 2024-07-08 RX ADMIN — SODIUM CHLORIDE: 9 INJECTION, SOLUTION INTRAVENOUS at 10:41

## 2024-07-08 RX ADMIN — MIDAZOLAM HYDROCHLORIDE 2 MG: 1 INJECTION, SOLUTION INTRAMUSCULAR; INTRAVENOUS at 11:19

## 2024-07-08 RX ADMIN — SODIUM CHLORIDE, POTASSIUM CHLORIDE, SODIUM LACTATE AND CALCIUM CHLORIDE: 600; 310; 30; 20 INJECTION, SOLUTION INTRAVENOUS at 13:49

## 2024-07-08 RX ADMIN — PROPOFOL 50 MG: 10 INJECTION, EMULSION INTRAVENOUS at 10:48

## 2024-07-08 RX ADMIN — PROPOFOL 100 MG: 10 INJECTION, EMULSION INTRAVENOUS at 10:45

## 2024-07-08 RX ADMIN — LIDOCAINE HYDROCHLORIDE 40 MG: 20 INJECTION, SOLUTION INFILTRATION; PERINEURAL at 10:45

## 2024-07-08 RX ADMIN — LORAZEPAM 1 MG: 2 INJECTION, SOLUTION INTRAMUSCULAR; INTRAVENOUS at 12:08

## 2024-07-08 ASSESSMENT — PAIN DESCRIPTION - LOCATION
LOCATION: HEAD
LOCATION: HEAD
LOCATION: BACK;LEG

## 2024-07-08 ASSESSMENT — PAIN - FUNCTIONAL ASSESSMENT
PAIN_FUNCTIONAL_ASSESSMENT: PREVENTS OR INTERFERES SOME ACTIVE ACTIVITIES AND ADLS
PAIN_FUNCTIONAL_ASSESSMENT: 0-10

## 2024-07-08 ASSESSMENT — PAIN DESCRIPTION - PAIN TYPE: TYPE: CHRONIC PAIN;ACUTE PAIN

## 2024-07-08 ASSESSMENT — PAIN DESCRIPTION - ORIENTATION: ORIENTATION: POSTERIOR

## 2024-07-08 ASSESSMENT — PAIN SCALES - GENERAL
PAINLEVEL_OUTOF10: 5
PAINLEVEL_OUTOF10: 6
PAINLEVEL_OUTOF10: 6

## 2024-07-08 ASSESSMENT — PAIN DESCRIPTION - FREQUENCY: FREQUENCY: CONTINUOUS

## 2024-07-08 ASSESSMENT — PAIN DESCRIPTION - DESCRIPTORS: DESCRIPTORS: ACHING;SHOOTING

## 2024-07-08 NOTE — ANESTHESIA POSTPROCEDURE EVALUATION
Department of Anesthesiology  Postprocedure Note    Patient: Yasemin Manzano  MRN: [id number]  YOB: 1995  Date of evaluation: 7/8/2024    Procedure Summary       Date: 07/08/24 Room / Location: [place] [address] / [facility] ENDOSCOPY    Anesthesia Start: 1041 Anesthesia Stop: 1101    Procedures:       ESOPHAGOGASTRODUODENOSCOPY (EGD) (Upper GI Region)      ESOPHAGOGASTRODUODENOSCOPY BIOPSY (Upper GI Region)      ESOPHAGOGASTRODUODENOSCOPY DILATION BALLOON (Upper GI Region) Diagnosis:       Chronic diarrhea      Abdominal pain, lower      (Chronic diarrhea [K52.9])      (Abdominal pain, lower [R10.30])    Surgeons: Jose Looney MD Responsible Provider: Elicia Mckeon MD    Anesthesia Type: MAC ASA Status: 2            Anesthesia Type: MAC    Coreen Phase I: Coreen Score: 10    Coreen Phase II: Coreen Score: 10    Anesthesia Post Evaluation    Patient location during evaluation: floor  Patient participation: complete - patient participated  Level of consciousness: anxious and awake and alert  Airway patency: patent  Nausea & Vomiting: no vomiting and no nausea  Cardiovascular status: hemodynamically stable  Respiratory status: acceptable  Hydration status: stable  Comments: Patient transferred to floor for admission for f/u of similar symptoms that occurred a few months ago   Pain management: adequate    No notable events documented.

## 2024-07-08 NOTE — PROGRESS NOTES
Patient stated that she needed to use the bathroom. I offered bedside commode and purewick, both of which she declined. She stated she was strong enough to get to bathroom and back. Patient was ambulated with this RN and the patient's mother to the bathroom. Patient was able to get to the toilet ok, but while trying to get back to the bed, the patient stated that her legs felt weak and that she thought she was going to pass out. We were able to ambulate her the rest of the way back to the bed with much support. Once the patient was back in bed, she stated that she felt like everything was spinning and she was slightly nauseated. Patient did not vomit. Vitals including BP remained stable the entire time. I advised the patient, that she would need to use a bedside commode or a purewick the next time she needed to void. She stated she understood.

## 2024-07-08 NOTE — OP NOTE
.                         [facility]  [facility]  Jose Looney MD  [phone]      2024    Esophagogastroduodenoscopy (EGD) Procedure Note  Yasemin Manzano  : 1995  [facility] Medical Record Number: [id number]      Indications:   esophageal dysphagia, postprandial nausea  Referring Physician:  Shruthi Field APRN - NP  Anesthesia/Sedation: Monitored anesthesia care, see separate note  Endoscopist:  Jose Looney MD   Complications:  None  Estimated Blood Loss:  None    Permit:  The indications, risks, benefits and alternatives were reviewed with the patient or their decision maker who was provided an opportunity to ask questions and all questions were answered.  The specific risks of esophagogastroduodenoscopy with conscious sedation were reviewed, including but not limited to anesthetic complication, bleeding, adverse drug reaction, missed lesion, infection, IV site reactions, and intestinal perforation which would lead to the need for surgical repair.  Alternatives to EGD including radiographic imaging, observation without testing, or laboratory testing were reviewed as well as the limitations of those alternatives discussed.  After considering the options and having all their questions answered, the patient or their decision maker provided both verbal and written consent to proceed.       Procedure in Detail:  After obtaining informed consent, positioning of the patient in the left lateral decubitus position, and conduction of a pre-procedure pause or \"time out\" the endoscope was introduced into the mouth and advanced to the duodenum.  A careful inspection was made, and findings or interventions are described below.    Findings:   Esophagus-normal-appearing esophagus and GE junction with minimal erythema noted in the distal esophagus; mid esophagus and GE junction widely patent to 18 to 20 mm through-the-scope balloon catheter; cold force

## 2024-07-08 NOTE — PROGRESS NOTES
Patient was able to stand and pivot to the bedside commode without problem. She still stated that she felt a little weak, but was able to pivot on her own with minimal assist. Again, vitals remained stable while ambulating.

## 2024-07-08 NOTE — ANESTHESIA PRE PROCEDURE
results found for: \"LABABO\"    Drug/Infectious Status (If Applicable):  No results found for: \"HIV\", \"HEPCAB\"    COVID-19 Screening (If Applicable): No results found for: \"COVID19\"        Anesthesia Evaluation    Airway: Mallampati: I  TM distance: >3 FB   Neck ROM: full  Mouth opening: > = 3 FB   Dental: normal exam         Pulmonary: breath sounds clear to auscultation  (+)           asthma:                            Cardiovascular:  Exercise tolerance: good (>4 METS)          Rhythm: regular  Rate: normal                    Neuro/Psych:   (+) headaches:, psychiatric history: Seizures: pseudo-seizure.           GI/Hepatic/Renal: Neg GI/Hepatic/Renal ROS            Endo/Other: Negative Endo/Other ROS                     ROS comment: Dysautonomia  Abdominal:             Vascular: negative vascular ROS.         Other Findings:       Anesthesia Plan      MAC     ASA 2       Induction: intravenous.      Anesthetic plan and risks discussed with patient.                    Elicia Mckeon MD   7/8/2024

## 2024-07-08 NOTE — PERIOP NOTE
1115  Called Anesthesia to bedside. Patient started with sudo seizure. Dr Mckeon gave 2 mg of Versed.    1125  Mom at bedside.    Blood pressures and O2 levels difficult to obtain with patient moving back and forth.    1150  Dr. Mckeon at bedside with mom. Patient talking some saying \"I don't feel right.\"    1200  Patient calm, breathing normal.    1205  Patient hyperventilating again.  Dr. Mckeon at bedside, 1mg of ativan given .     1215  Little change in patient's condition. Hyperventilating and shaking her head side to side.     1225  Hospitalist consulted.    1230  Patient calm, breathing normally.     1240  Shaking started again, Dr. Mckeon ordered Versed.     1250  2mg Versed given.   Father, John Paul, at bedside.     1325  Hospitalist at bedside, patient will be admitted.     1353  Bed not available for inpatient yet.   Patient used bedpan, resting in bed.     1420  Patient sleeping.     1515  Called report to ICU, will transfer patient.

## 2024-07-08 NOTE — PROGRESS NOTES
CRE balloon dilatation of the esophagus   18 mm Balloon inflated to 3 ATMs and held for 5 seconds.  19 mm Balloon inflated to 4.5 ATMs and held for 5 seconds.  20 mm Balloon inflated to 6 ATMs and held for 20 seconds.    No subcutaneous crepitus of the chest or cervical region was noted post dilatation.

## 2024-07-08 NOTE — PROGRESS NOTES

## 2024-07-08 NOTE — PLAN OF CARE
Problem: Safety - Adult  Goal: Free from fall injury  Outcome: Progressing     Problem: Pain  Goal: Verbalizes/displays adequate comfort level or baseline comfort level  Outcome: Progressing     Problem: Discharge Planning  Goal: Discharge to home or other facility with appropriate resources  Outcome: Progressing     Problem: Chronic Conditions and Co-morbidities  Goal: Patient's chronic conditions and co-morbidity symptoms are monitored and maintained or improved  Outcome: Progressing

## 2024-07-08 NOTE — H&P
.Pre-Endoscopy H&P Update  Chief complaint/HPI/ROS:  The indication for the procedure, the patient's history and the patient's current medications are reviewed prior to the procedure and that data is reported on the H&P to which this document is attached.  Any significant complaints with regard to organ systems will be noted, and if not mentioned then a review of systems is not contributory.  Past Medical History:   Diagnosis Date    Anxiety     Asthma     Depression     Dysnomia     Headache     Migraine     Prolonged emergence from general anesthesia       Past Surgical History:   Procedure Laterality Date    APPENDECTOMY       SECTION  2023    CHOLECYSTECTOMY       Social   Social History     Tobacco Use    Smoking status: Never     Passive exposure: Never    Smokeless tobacco: Never   Substance Use Topics    Alcohol use: Never      History reviewed. No pertinent family history.   Allergies   Allergen Reactions    Beef Allergy Anaphylaxis and Hives    Bupropion Hives    Divalproex Sodium Hives    Valproic Acid Hives      Prior to Admission Medications   Prescriptions Last Dose Informant Patient Reported? Taking?   FLUoxetine (PROZAC) 10 MG capsule 2024  Yes Yes   Sig: Take 1 capsule by mouth daily   Multiple Vitamin (MULTIVITAMIN PO) 2024  Yes Yes   Sig: Take by mouth Takes one po once daily.   Prenatal Vit-Fe Fumarate-FA (PRENATAL VITAMIN PO) 2024  Yes Yes   Sig: Take by mouth Takes one po once daily,   albuterol sulfate HFA (VENTOLIN HFA) 108 (90 Base) MCG/ACT inhaler Past Month  Yes Yes   Sig: Inhale 2 puffs into the lungs every 6 hours as needed for Wheezing      Facility-Administered Medications: None       PHYSICAL EXAM:  The patient is examined immediately prior to the procedure.  Vitals:    24 0908   BP: (!) 99/55   Pulse: 78   Resp: 16   Temp: 98.2 °F (36.8 °C)   SpO2: 99%     Gen: Appears comfortable, no distress.  Pulm: comfortable respirations with no abnormal audible 
Results   Component Value Date/Time    WBC 6.5 02/20/2024 01:54 AM    WBC 6.2 02/19/2024 04:20 PM    HGB 12.0 02/20/2024 01:54 AM    HGB 12.4 02/19/2024 04:20 PM    HCT 35.3 02/20/2024 01:54 AM    HCT 36.6 02/19/2024 04:20 PM     02/20/2024 01:54 AM     02/19/2024 04:20 PM     Lab Results   Component Value Date/Time     02/20/2024 01:54 AM     02/19/2024 04:20 PM    K 4.3 02/20/2024 01:54 AM    K 3.9 02/19/2024 04:20 PM     02/20/2024 01:54 AM     02/19/2024 04:20 PM    CO2 24 02/20/2024 01:54 AM    CO2 24 02/19/2024 04:20 PM    BUN 13 02/20/2024 01:54 AM    BUN 14 02/19/2024 04:20 PM    MG 1.9 02/20/2024 01:54 AM    MG 2.0 02/19/2024 04:20 PM    PHOS 2.3 02/20/2024 01:54 AM    ALT 21 02/20/2024 01:54 AM    ALT 18 02/19/2024 04:20 PM       Imaging  No orders to display         Assessment and Plan     Postanesthesia complication  Pseudoseizure like behavior  -She was given 4 mg of Versed and 1 mg of Ativan with minimal improvement  -Will increase IV fluids and switch to LR  -Will give a dose of Valium  -Monitor closely in IMCU  -Check CMP and CBC, check lactic acid  -Will avoid AEDs at this time since she had a normal EEG and seizures were ruled out in the previous admission    Hypotension  Tachycardia  -Low concern for sepsis  -Increase IV fluids as above and monitor closely    Anxiety and depression  -On Prozac, will hold off on this until she is reliably able to take p.o.      Diet: Regular  Activity: As tolerated  DVT prophylaxis: SCDs  Isolation precautions: None       Signed by: Lila Alonso MD    July 8, 2024 at 1:41 PM

## 2024-07-08 NOTE — PERIOP NOTE
Report called to Brittnee in ICU. History, today's upper endoscopy, findings, interventions in Endoscopy  and patient's present condition reported. All questions answered.     Patient brought up to ICU by an Endoscopy nurse.

## 2024-07-08 NOTE — DISCHARGE INSTRUCTIONS
MRI appointment: Thursday July 11, 2024 at 7:30 am. (Arrive at 7am)    Future Appointments   Date Time Provider Department Center   7/11/2024  7:30 AM [facility] MRI MOBILE 2 SFMRMRI [facility]   7/11/2024  8:15 AM [facility] MRI MOBILE 2 MRMRI [facility]         .                       [facility]  [facility]  Jose Pena MD  [phone]      July 8, 2024    Yasemin Manzano  YOB: 1995    EGD DISCHARGE INSTRUCTIONS    If there is redness at IV site you should apply warm compress to area.  If redness or soreness persist contact Dr. Pena's or your primary care doctor.    There may be a slight amount of blood passed from the rectum.  Gaseous discomfort may develop, but walking, belching will help relieve this.  You may not operate a vehicle for 12 hours  You may not operate machinery or dangerous appliances for rest of today  You may not drink alcoholic beverages for 12 hours  Avoid making any critical decisions for 24 hours    DIET:  You may resume your normal diet, but some patients find that heavy or large meals may lead to indigestion or vomiting.  I suggest a light meal as first food intake.    MEDICATIONS:  The use of some over-the-counter pain medication may lead to bleeding after colon biopsies or polyp removal.  Tylenol (also called acetaminophen) is safe to take even if you have had colonoscopy with polyp removal.    Remember that Tylenol (also called acetaminophen) is safe to take after colonoscopy even if you have had biopsies or polyps removed.    ACTIVITY:  You may resume your normal household activities, but it is recommended that you spend the remainder of the day resting -  avoid any strenuous activity.    CALL DR. PENA'S OFFICE IF:  Increasing pain, nausea, vomiting  Abdominal distension (swelling)  Significant new or increased bleeding (oral or rectal)  Fever/Chills  Chest pain/shortness of breath                       Additional instructions:     Impression:

## 2024-07-09 VITALS
WEIGHT: 93.47 LBS | HEIGHT: 61 IN | TEMPERATURE: 98.1 F | OXYGEN SATURATION: 90 % | BODY MASS INDEX: 17.65 KG/M2 | HEART RATE: 60 BPM | RESPIRATION RATE: 16 BRPM | SYSTOLIC BLOOD PRESSURE: 103 MMHG | DIASTOLIC BLOOD PRESSURE: 81 MMHG

## 2024-07-09 PROCEDURE — 6370000000 HC RX 637 (ALT 250 FOR IP): Performed by: FAMILY MEDICINE

## 2024-07-09 PROCEDURE — 94761 N-INVAS EAR/PLS OXIMETRY MLT: CPT

## 2024-07-09 PROCEDURE — 2580000003 HC RX 258: Performed by: FAMILY MEDICINE

## 2024-07-09 PROCEDURE — 99222 1ST HOSP IP/OBS MODERATE 55: CPT | Performed by: PSYCHIATRY & NEUROLOGY

## 2024-07-09 RX ORDER — MIDODRINE HYDROCHLORIDE 2.5 MG/1
2.5 TABLET ORAL 2 TIMES DAILY
Qty: 60 TABLET | Refills: 0 | Status: SHIPPED | OUTPATIENT
Start: 2024-07-09

## 2024-07-09 RX ORDER — BUTALBITAL, ACETAMINOPHEN AND CAFFEINE 50; 325; 40 MG/1; MG/1; MG/1
1 TABLET ORAL EVERY 6 HOURS PRN
Qty: 30 TABLET | Refills: 0 | Status: SHIPPED | OUTPATIENT
Start: 2024-07-09

## 2024-07-09 RX ADMIN — SODIUM CHLORIDE, POTASSIUM CHLORIDE, SODIUM LACTATE AND CALCIUM CHLORIDE: 600; 310; 30; 20 INJECTION, SOLUTION INTRAVENOUS at 03:51

## 2024-07-09 RX ADMIN — BUTALBITAL, ACETAMINOPHEN, AND CAFFEINE 1 TABLET: 50; 325; 40 TABLET ORAL at 08:27

## 2024-07-09 RX ADMIN — SODIUM CHLORIDE, PRESERVATIVE FREE 10 ML: 5 INJECTION INTRAVENOUS at 08:28

## 2024-07-09 RX ADMIN — SODIUM CHLORIDE, POTASSIUM CHLORIDE, SODIUM LACTATE AND CALCIUM CHLORIDE: 600; 310; 30; 20 INJECTION, SOLUTION INTRAVENOUS at 10:44

## 2024-07-09 ASSESSMENT — PAIN SCALES - GENERAL
PAINLEVEL_OUTOF10: 6
PAINLEVEL_OUTOF10: 8
PAINLEVEL_OUTOF10: 8

## 2024-07-09 ASSESSMENT — PAIN DESCRIPTION - LOCATION
LOCATION: HEAD

## 2024-07-09 ASSESSMENT — PAIN - FUNCTIONAL ASSESSMENT: PAIN_FUNCTIONAL_ASSESSMENT: PREVENTS OR INTERFERES SOME ACTIVE ACTIVITIES AND ADLS

## 2024-07-09 ASSESSMENT — PAIN DESCRIPTION - DESCRIPTORS: DESCRIPTORS: ACHING;SHOOTING

## 2024-07-09 ASSESSMENT — PAIN DESCRIPTION - ORIENTATION: ORIENTATION: POSTERIOR

## 2024-07-09 ASSESSMENT — PAIN DESCRIPTION - PAIN TYPE: TYPE: ACUTE PAIN

## 2024-07-09 NOTE — CONSULTS
CONSULT - Neurology      Name:  Yasemin Manzano       MRN: [id number]  Location: A463/01    Date: 2024  Time:  9:01 AM        Chief Complaint: No chief complaint on file.      HPI:  It is a great pleasure to see Yasemin Manzano, a 29 y.o. female today in the hospital . Briefly these are the events happened as per the chart and taken from the chart.  The patient was doing fine and the symptoms started  In the postanesthesia recovery area she started having involuntary movements of her neck. She is awake and can communicate during this time. She states she does not feel well. . The symptoms fluctuated in the beginning. There were no aggravating and relieving factors.       PAST MEDICAL HISTORY:  Past Medical History:   Diagnosis Date    Anxiety     Asthma     Depression     Dysnomia     Headache     Migraine     Prolonged emergence from general anesthesia      PAST SURGICAL HISTORY:    Past Surgical History:   Procedure Laterality Date    APPENDECTOMY       SECTION  2023    CHOLECYSTECTOMY      UPPER GASTROINTESTINAL ENDOSCOPY N/A 2024    ESOPHAGOGASTRODUODENOSCOPY (EGD) performed by Jose Looney MD at [facility] ENDOSCOPY    UPPER GASTROINTESTINAL ENDOSCOPY N/A 2024    ESOPHAGOGASTRODUODENOSCOPY BIOPSY performed by Jose Looney MD at [facility] ENDOSCOPY    UPPER GASTROINTESTINAL ENDOSCOPY N/A 2024    ESOPHAGOGASTRODUODENOSCOPY DILATION BALLOON performed by Jose Looney MD at [facility] ENDOSCOPY     FAMILY HISTORY:  History reviewed. No pertinent family history.  SOCIAL HISTORY:   Social History     Tobacco Use    Smoking status: Never     Passive exposure: Never    Smokeless tobacco: Never   Substance Use Topics    Alcohol use: Never      ALLERGIES:   Allergies   Allergen Reactions    Beef Allergy Anaphylaxis and Hives    Gluten Hives    Milk (Cow) Itching    Bupropion Hives    Divalproex Sodium Hives    Valproic Acid Hives      HOME MEDICATIONS:  Prior to Admission medications    Medication Sig Start Date End

## 2024-07-09 NOTE — PROGRESS NOTES
Spoke with Dr. Alonso regarding the patients neuro recommendations noted in the note by Dr. Moreau. She stated pt has MRI scheduled Thursday AM and can be discharged at this time. No further orders.    Linda Mejia RN

## 2024-07-09 NOTE — CARE COORDINATION
Case Management Assessment  Initial Evaluation    Date/Time of Evaluation: 7/9/2024 7:24 AM  Assessment Completed by: LUZ EATON RN    If patient is discharged prior to next notation, then this note serves as note for discharge by case management.    Patient Name: Yasemin Manzano                   YOB: 1995  Diagnosis: Chronic diarrhea [K52.9]  Abdominal pain, lower [R10.30]  Anesthesia complication, initial encounter [T88.59XA]                   Date / Time: 7/8/2024  8:38 AM    Patient Admission Status: Inpatient   Readmission Risk (Low < 19, Mod (19-27), High > 27): Readmission Risk Score: 7.8    Current PCP: Shruthi Field APRN - NP    Chart Reviewed: yes      History Provided by:  pt/EMR  Patient Orientation:    alert and oriented x 4   Patient Cognition:  intact    Hospitalization in the last 30 days (Readmission):  no      Advance Directives:      Code Status: Full Code   Patient's Primary Decision Maker is:  Agustin Ant @ [phone]    Discharge Planning:    Patient lives with: \"an older couple\"    Primary Care Giver:  self  Patient Support Systems include:   mother  Current Financial resources:  [place] medicaid  Current community resources: medicaid transportation    Current services prior to admission:  OP services @ [facility]            Current DME:  none            Type of Home Care services:   none needed    ADLS  Prior functional level:  independent  Current functional level:  PT/OT consultations today    Family can provide assistance at DC:  mother if needed but not anticipated  Would you like Case Management to discuss the discharge plan with any other family members/significant others, and if so, who?  mother  Plans to Return to Present Housing:  yes  Other Identified Issues/Barriers to RETURNING to current housing: no barriers identified   Potential Assistance needed at discharge: none identified             Potential DME:  to be determined by PT/OT but not

## 2024-07-09 NOTE — PROGRESS NOTES
Physical Therapy    Chart reviewed in preparation for PT evaluation.  Note patient admitted for pseudoseizure activity following anesthesia for a planned EGD.  Note patient now has a d/c order in.  Consulted with RN, who advised that patient is declining PT/OT evaluations. Will follow-up if patient does not d/c for some reason.    Jimi Macias, PT

## 2024-07-09 NOTE — PROGRESS NOTES
Occupational Therapy    Chart reviewed in preparation for OT evaluation.  Note patient admitted for pseudoseizure activity following anesthesia for a planned EGD.  Note patient now has a d/c order in.  Consulted with RN, who advised that patient is declining PT/OT evaluations. Will follow-up if patient does not d/c for some reason.    Isaac Huddleston, OTR/L

## 2024-07-09 NOTE — DISCHARGE SUMMARY
[facility]  [address] [place] [address] [phone]    [facility]  Hospitalist Group                                                                                          Hospitalist Progress Note  Lila Alonso MD        Date of Service:  2024  NAME:  Yasemin Manzano  :  1995  MRN:  [id number]      Admission Summary:   30 yo female is admitted with pseudoseizure activity after undergoing anesthesia    Interval history / Subjective:     Much better now, reports headache and fioricet last night helped very much.      Assessment & Plan:     Postanesthesia complication  Pseudoseizure like behavior  -She was given 4 mg of Versed and 1 mg of Ativan with minimal improvement  -given IVF  -Will avoid AEDs at this time since she had a normal EEG and seizures were ruled out in the previous admission     Hypotension  Tachycardia  -Low concern for sepsis  -Improved some with IVF but still low, good MAPs. She states she always has low bps  -recommend increased salt and water intake, can also drink caffeine once a day  -trial of midodrine if above measures dont help. Follow up with pcp     Anxiety and depression  -On Prozac, will hold off on this until she is reliably able to take p.o.    Neck pain  Paresthesias  Weakness  -MRI C and T spine ordered outpatient   -seen by neurology  -she has a neurology appt [facility]    Outisde Records, prior notes, labs, radiology, and medications reviewed     Code status: full  DVT prophylaxis: [facility] Problems             Last Modified POA    * (Principal) Anesthesia complication, initial encounter 2024 Yes          Review of Systems:   Pertinent items are noted in HPI.       Vital Signs:    Last 24hrs VS reviewed since prior progress note. Most recent are:  Vitals:    24 1130   BP: 103/81   Pulse: 60   Resp: 16   Temp: 98.1 °F (36.7 °C)   SpO2: 90%         Intake/Output Summary

## 2024-07-11 ENCOUNTER — HOSPITAL ENCOUNTER (OUTPATIENT)
Facility: HOSPITAL | Age: 29
DRG: 918 | End: 2024-07-11
Attending: FAMILY MEDICINE
Payer: MEDICAID

## 2024-07-11 VITALS — WEIGHT: 93 LBS | BODY MASS INDEX: 17.47 KG/M2

## 2024-07-11 DIAGNOSIS — R55 SYNCOPE AND COLLAPSE: ICD-10-CM

## 2024-07-11 DIAGNOSIS — R56.9 OBSERVED SEIZURE-LIKE ACTIVITY (HCC): ICD-10-CM

## 2024-07-11 PROCEDURE — A9579 GAD-BASE MR CONTRAST NOS,1ML: HCPCS | Performed by: RADIOLOGY

## 2024-07-11 PROCEDURE — 72156 MRI NECK SPINE W/O & W/DYE: CPT

## 2024-07-11 PROCEDURE — 6360000004 HC RX CONTRAST MEDICATION: Performed by: RADIOLOGY

## 2024-07-11 PROCEDURE — 72157 MRI CHEST SPINE W/O & W/DYE: CPT

## 2024-07-11 RX ADMIN — GADOTERIDOL 8 ML: 279.3 INJECTION, SOLUTION INTRAVENOUS at 08:29

## 2024-12-12 ENCOUNTER — OFFICE VISIT (OUTPATIENT)
Age: 29
End: 2024-12-12

## 2024-12-12 VITALS
BODY MASS INDEX: 16.62 KG/M2 | DIASTOLIC BLOOD PRESSURE: 63 MMHG | HEART RATE: 66 BPM | SYSTOLIC BLOOD PRESSURE: 110 MMHG | WEIGHT: 88 LBS | RESPIRATION RATE: 18 BRPM | HEIGHT: 61 IN | OXYGEN SATURATION: 97 % | TEMPERATURE: 98.1 F

## 2024-12-12 DIAGNOSIS — M25.561 ACUTE PAIN OF RIGHT KNEE: Primary | ICD-10-CM

## 2024-12-12 DIAGNOSIS — M25.461 EFFUSION OF KNEE JOINT RIGHT: ICD-10-CM

## 2024-12-12 RX ORDER — IBUPROFEN 200 MG
600 TABLET ORAL ONCE
Status: COMPLETED | OUTPATIENT
Start: 2024-12-12 | End: 2024-12-12

## 2024-12-12 RX ADMIN — Medication 600 MG: at 13:57

## 2024-12-12 NOTE — PATIENT INSTRUCTIONS
Thank you for visiting [facility] today.    Treatment for an injured knee is easy to remember if you think of the word \"RICE\":  REST - To rest the knee, you can use crutches (if provided) and stay off your feet    ICE - Apply a cold gel pack, bag of ice, or bag of frozen vegetables on your knee every 1 to 2 hours, for 15 minutes each time.  Put a thin towel between the ice (or other cold object) and your skin.  Use the ice (or other cold object) for at least 6 hours after your injury.  Some people find it helpful to ice longer, even up to 2 days after their injury.    COMPRESSION - Compression basically means pressure.  You want to have your knee under slight pressure by having it wrapped in an elastic \"compression\" bandage.  This helps reduce swelling and supports the knee.  Your doctor or nurse will show you how to wrap your knee.  It's important that you do not use too much pressure and cut off the blood flow to your foot.    ELEVATION - \"Elevation\" means you should keep your knee raised up above the level of your heart.  To do this, you can put your lmee on some pillows or blankets while you are laying down, or on a table or chair while you are sitting.    -You can also take medicines to relieve pain, such as acetaminophen (Tylenol), ibuprofen (Advil, Motrin) or naproxen (Aleve)     Follow up with orthopaedist or the emergency room if symptoms worsen or persist.

## 2024-12-12 NOTE — PROGRESS NOTES
concrete this morning. Right knee pain. Unable to put weight or bend knee.)      Results for orders placed or performed in visit on 12/12/24   XR KNEE RIGHT (3 VIEWS)    Narrative    EXAM:  right Knee, 3  View.    COMPARISON:  None provided.      FINDINGS:    BONES:  No acute fracture or aggressive appearing osseous lesion.     JOINTS:  Joint effusion.     SOFT TISSUES:  The soft tissues are unremarkable.      Impression    1. Joint effusion.   2. No acute osseous findings.   3. Consider MRI for further evaluation if patient has persistent pain.    Electronically signed by: Ranjit Nueñz M.D. on 12/12/2024 01:49:31 PM   /Eastern         XR Results (most recent):  @BSHSILASTIMGCAT([id number])@         Physical Exam  Constitutional:       Appearance: Normal appearance.   HENT:      Head: Normocephalic and atraumatic.      Right Ear: Tympanic membrane, ear canal and external ear normal.      Left Ear: Tympanic membrane, ear canal and external ear normal.      Nose: Nose normal.      Mouth/Throat:      Mouth: Mucous membranes are moist.   Eyes:      Extraocular Movements: Extraocular movements intact.      Conjunctiva/sclera: Conjunctivae normal.      Pupils: Pupils are equal, round, and reactive to light.   Cardiovascular:      Rate and Rhythm: Normal rate.      Pulses: Normal pulses.   Pulmonary:      Effort: Pulmonary effort is normal.      Breath sounds: Normal breath sounds.   Abdominal:      Palpations: Abdomen is soft.   Musculoskeletal:      Cervical back: Normal range of motion.      Right knee: Swelling present. Decreased range of motion. Tenderness present.   Skin:     General: Skin is warm.   Neurological:      General: No focal deficit present.      Mental Status: She is alert and oriented to person, place, and time. Mental status is at baseline.   Psychiatric:         Mood and Affect: Mood normal.         Behavior: Behavior normal.         Judgment: Judgment normal.        Vitals:    12/12/24 1312

## 2025-03-24 ENCOUNTER — OFFICE VISIT (OUTPATIENT)
Age: 30
End: 2025-03-24

## 2025-03-24 VITALS
HEART RATE: 69 BPM | OXYGEN SATURATION: 98 % | RESPIRATION RATE: 17 BRPM | BODY MASS INDEX: 18.74 KG/M2 | WEIGHT: 99.2 LBS | SYSTOLIC BLOOD PRESSURE: 108 MMHG | DIASTOLIC BLOOD PRESSURE: 75 MMHG | TEMPERATURE: 97.6 F

## 2025-03-24 DIAGNOSIS — M25.561 ACUTE PAIN OF RIGHT KNEE: Primary | ICD-10-CM

## 2025-03-24 RX ORDER — CYCLOBENZAPRINE HCL 5 MG
5 TABLET ORAL 3 TIMES DAILY PRN
Qty: 30 TABLET | Refills: 0 | Status: SHIPPED | OUTPATIENT
Start: 2025-03-24 | End: 2025-04-03

## 2025-03-24 RX ORDER — DICLOFENAC SODIUM 75 MG/1
75 TABLET, DELAYED RELEASE ORAL 2 TIMES DAILY
Qty: 28 TABLET | Refills: 0 | Status: SHIPPED | OUTPATIENT
Start: 2025-03-24 | End: 2025-04-07

## 2025-03-24 ASSESSMENT — ENCOUNTER SYMPTOMS: COLOR CHANGE: 0

## (undated) DEVICE — SYRINGE MED 5ML STD CLR PLAS LUERLOCK TIP N CTRL DISP

## (undated) DEVICE — CATHETER IV 22GA L1IN OD0.8382-0.9144MM ID0.6096-0.6858MM 382523

## (undated) DEVICE — SOLIDIFIER FLD 2OZ 1500CC N DISINF IN BTL DISP SAFESORB

## (undated) DEVICE — BLUNTFILL WITH FILTER: Brand: MONOJECT

## (undated) DEVICE — 1200 GUARD II KIT W/5MM TUBE W/O VAC TUBE: Brand: GUARDIAN

## (undated) DEVICE — ESOPHAGEAL BALLOON DILATATION CATHETER: Brand: CRE FIXED WIRE

## (undated) DEVICE — SYRINGE MEDICAL 3ML CLEAR PLASTIC STANDARD NON CONTROL LUERLOCK TIP DISPOSABLE

## (undated) DEVICE — KIT COLON W/ 1.1OZ LUB AND 2 END

## (undated) DEVICE — SET ADMIN 16ML TBNG L100IN 2 Y INJ SITE IV PIGGY BK DISP (ORDER IN MULIPLES OF 48)

## (undated) DEVICE — BITEBLOCK ENDOSCP 60FR MAXI WHT POLYETH STURDY W/ VELC WVN

## (undated) DEVICE — ELECTRODE,RADIOTRANSLUCENT,FOAM,3PK: Brand: MEDLINE

## (undated) DEVICE — BLUNTFILL: Brand: MONOJECT

## (undated) DEVICE — IV STRT KT 3282] LSL INDUSTRIES INC]

## (undated) DEVICE — CANNULA CUSH AD W/ 14FT TBG